# Patient Record
Sex: FEMALE | Race: WHITE | Employment: STUDENT | URBAN - METROPOLITAN AREA
[De-identification: names, ages, dates, MRNs, and addresses within clinical notes are randomized per-mention and may not be internally consistent; named-entity substitution may affect disease eponyms.]

---

## 2019-02-07 ENCOUNTER — HOSPITAL ENCOUNTER (OUTPATIENT)
Dept: GENERAL RADIOLOGY | Age: 19
Discharge: HOME | End: 2019-02-07
Payer: COMMERCIAL

## 2019-02-07 DIAGNOSIS — M47.817 SPONDYLOSIS OF LUMBOSACRAL REGION: ICD-10-CM

## 2019-02-07 PROCEDURE — 72110 X-RAY EXAM L-2 SPINE 4/>VWS: CPT

## 2020-01-12 ENCOUNTER — HOSPITAL ENCOUNTER (INPATIENT)
Age: 20
LOS: 4 days | Discharge: HOME | DRG: 137 | End: 2020-01-16
Attending: FAMILY MEDICINE | Admitting: FAMILY MEDICINE
Payer: COMMERCIAL

## 2020-01-12 DIAGNOSIS — J01.00 ACUTE ABSCESS OF MAXILLARY SINUS: Primary | ICD-10-CM

## 2020-01-12 PROBLEM — J32.0 MAXILLARY SINUSITIS: Status: ACTIVE | Noted: 2020-01-12

## 2020-01-12 PROCEDURE — 0W930ZZ DRAINAGE OF ORAL CAVITY AND THROAT, OPEN APPROACH: ICD-10-PCS | Performed by: PHYSICIAN ASSISTANT

## 2020-01-12 PROCEDURE — 74011250637 HC RX REV CODE- 250/637: Performed by: FAMILY MEDICINE

## 2020-01-12 PROCEDURE — 77030020263 HC SOL INJ SOD CL0.9% LFCR 1000ML

## 2020-01-12 PROCEDURE — 65270000029 HC RM PRIVATE

## 2020-01-12 RX ORDER — SODIUM CHLORIDE 9 MG/ML
1000 INJECTION, SOLUTION INTRAVENOUS CONTINUOUS
Status: DISPENSED | OUTPATIENT
Start: 2020-01-13 | End: 2020-01-13

## 2020-01-12 RX ORDER — SULFAMETHOXAZOLE AND TRIMETHOPRIM 800; 160 MG/1; MG/1
1 TABLET ORAL EVERY 12 HOURS
COMMUNITY
Start: 2020-01-11 | End: 2020-01-16

## 2020-01-12 RX ORDER — OXYCODONE AND ACETAMINOPHEN 10; 325 MG/1; MG/1
1 TABLET ORAL
Status: DISCONTINUED | OUTPATIENT
Start: 2020-01-12 | End: 2020-01-12

## 2020-01-12 RX ORDER — ESCITALOPRAM OXALATE 5 MG/1
10 TABLET ORAL DAILY
COMMUNITY

## 2020-01-12 RX ORDER — LEVONORGESTREL AND ETHINYL ESTRADIOL 0.1-0.02MG
1 KIT ORAL DAILY
COMMUNITY

## 2020-01-12 RX ORDER — ACETAMINOPHEN 325 MG/1
650 TABLET ORAL
Status: DISCONTINUED | OUTPATIENT
Start: 2020-01-12 | End: 2020-01-16 | Stop reason: HOSPADM

## 2020-01-12 RX ORDER — ESCITALOPRAM OXALATE 10 MG/1
5 TABLET ORAL DAILY
Status: DISCONTINUED | OUTPATIENT
Start: 2020-01-13 | End: 2020-01-16 | Stop reason: HOSPADM

## 2020-01-12 RX ORDER — ALPRAZOLAM 0.5 MG/1
0.25 TABLET ORAL
Status: DISCONTINUED | OUTPATIENT
Start: 2020-01-12 | End: 2020-01-16 | Stop reason: HOSPADM

## 2020-01-12 RX ORDER — LIDOCAINE HYDROCHLORIDE 10 MG/ML
10 INJECTION INFILTRATION; PERINEURAL ONCE
Status: DISPENSED | OUTPATIENT
Start: 2020-01-13 | End: 2020-01-13

## 2020-01-12 RX ORDER — ONDANSETRON 2 MG/ML
4 INJECTION INTRAMUSCULAR; INTRAVENOUS
Status: DISCONTINUED | OUTPATIENT
Start: 2020-01-12 | End: 2020-01-16 | Stop reason: HOSPADM

## 2020-01-12 RX ORDER — MORPHINE SULFATE 10 MG/ML
5 INJECTION, SOLUTION INTRAMUSCULAR; INTRAVENOUS
Status: DISCONTINUED | OUTPATIENT
Start: 2020-01-12 | End: 2020-01-12

## 2020-01-12 RX ORDER — NALOXONE HYDROCHLORIDE 0.4 MG/ML
0.4 INJECTION, SOLUTION INTRAMUSCULAR; INTRAVENOUS; SUBCUTANEOUS AS NEEDED
Status: DISCONTINUED | OUTPATIENT
Start: 2020-01-12 | End: 2020-01-16 | Stop reason: HOSPADM

## 2020-01-12 RX ORDER — IBUPROFEN 200 MG
600 TABLET ORAL
COMMUNITY

## 2020-01-12 RX ORDER — ALPRAZOLAM 0.25 MG/1
0.5 TABLET ORAL
COMMUNITY

## 2020-01-12 RX ORDER — LEVONORGESTREL AND ETHINYL ESTRADIOL 0.1-0.02MG
1 KIT ORAL DAILY
Status: DISCONTINUED | OUTPATIENT
Start: 2020-01-13 | End: 2020-01-16 | Stop reason: HOSPADM

## 2020-01-12 RX ORDER — PSEUDOEPHEDRINE HCL 30 MG
60 TABLET ORAL
COMMUNITY
End: 2020-01-16

## 2020-01-12 RX ORDER — OXYMETAZOLINE HCL 0.05 %
2 SPRAY, NON-AEROSOL (ML) NASAL 2 TIMES DAILY
Status: DISCONTINUED | OUTPATIENT
Start: 2020-01-13 | End: 2020-01-16 | Stop reason: HOSPADM

## 2020-01-12 RX ORDER — ALPRAZOLAM 0.5 MG/1
0.25 TABLET ORAL
Status: DISCONTINUED | OUTPATIENT
Start: 2020-01-12 | End: 2020-01-12

## 2020-01-12 RX ORDER — IBUPROFEN 800 MG/1
800 TABLET ORAL
Status: DISCONTINUED | OUTPATIENT
Start: 2020-01-12 | End: 2020-01-16 | Stop reason: HOSPADM

## 2020-01-12 RX ORDER — HYDROMORPHONE HYDROCHLORIDE 1 MG/ML
0.5 INJECTION, SOLUTION INTRAMUSCULAR; INTRAVENOUS; SUBCUTANEOUS
Status: DISCONTINUED | OUTPATIENT
Start: 2020-01-12 | End: 2020-01-14

## 2020-01-12 RX ORDER — BISACODYL 5 MG
5 TABLET, DELAYED RELEASE (ENTERIC COATED) ORAL DAILY PRN
Status: DISCONTINUED | OUTPATIENT
Start: 2020-01-12 | End: 2020-01-16 | Stop reason: HOSPADM

## 2020-01-12 RX ORDER — HYDROCODONE BITARTRATE AND ACETAMINOPHEN 7.5; 325 MG/1; MG/1
1 TABLET ORAL
Status: DISCONTINUED | OUTPATIENT
Start: 2020-01-12 | End: 2020-01-14

## 2020-01-12 RX ORDER — SODIUM CHLORIDE 0.9 % (FLUSH) 0.9 %
5-40 SYRINGE (ML) INJECTION EVERY 8 HOURS
Status: DISCONTINUED | OUTPATIENT
Start: 2020-01-13 | End: 2020-01-16 | Stop reason: HOSPADM

## 2020-01-12 RX ORDER — DIPHENHYDRAMINE HCL 25 MG
25 CAPSULE ORAL
Status: DISCONTINUED | OUTPATIENT
Start: 2020-01-12 | End: 2020-01-16 | Stop reason: HOSPADM

## 2020-01-12 RX ORDER — HYDROCODONE BITARTRATE AND ACETAMINOPHEN 5; 325 MG/1; MG/1
1 TABLET ORAL
COMMUNITY
End: 2020-01-16

## 2020-01-12 RX ORDER — LORAZEPAM 1 MG/1
1 TABLET ORAL
Status: CANCELLED | OUTPATIENT
Start: 2020-01-12

## 2020-01-12 RX ORDER — OXYMETAZOLINE HCL 0.05 %
2 SPRAY, NON-AEROSOL (ML) NASAL
COMMUNITY

## 2020-01-12 RX ORDER — CLINDAMYCIN PHOSPHATE 600 MG/50ML
600 INJECTION INTRAVENOUS EVERY 6 HOURS
Status: DISCONTINUED | OUTPATIENT
Start: 2020-01-13 | End: 2020-01-16

## 2020-01-12 RX ORDER — SODIUM CHLORIDE 0.9 % (FLUSH) 0.9 %
5-40 SYRINGE (ML) INJECTION AS NEEDED
Status: DISCONTINUED | OUTPATIENT
Start: 2020-01-12 | End: 2020-01-16 | Stop reason: HOSPADM

## 2020-01-12 RX ADMIN — Medication 10 ML: at 23:50

## 2020-01-12 RX ADMIN — ALPRAZOLAM 0.25 MG: 0.5 TABLET ORAL at 23:56

## 2020-01-12 RX ADMIN — HYDROCODONE BITARTRATE AND ACETAMINOPHEN 1 TABLET: 7.5; 325 TABLET ORAL at 23:56

## 2020-01-13 ENCOUNTER — ANESTHESIA EVENT (OUTPATIENT)
Dept: SURGERY | Age: 20
DRG: 137 | End: 2020-01-13
Payer: COMMERCIAL

## 2020-01-13 ENCOUNTER — ANESTHESIA (OUTPATIENT)
Dept: SURGERY | Age: 20
DRG: 137 | End: 2020-01-13
Payer: COMMERCIAL

## 2020-01-13 PROBLEM — J01.00 ACUTE ABSCESS OF MAXILLARY SINUS: Status: ACTIVE | Noted: 2020-01-13

## 2020-01-13 LAB
ALBUMIN SERPL-MCNC: 3.6 G/DL (ref 3.5–5)
ALBUMIN/GLOB SERPL: 0.8 {RATIO} (ref 1.2–3.5)
ALP SERPL-CCNC: 72 U/L (ref 50–136)
ALT SERPL-CCNC: 58 U/L (ref 12–65)
ANION GAP SERPL CALC-SCNC: 6 MMOL/L (ref 7–16)
AST SERPL-CCNC: 30 U/L (ref 15–37)
BASOPHILS # BLD: 0 K/UL (ref 0–0.2)
BASOPHILS # BLD: 0 K/UL (ref 0–0.2)
BASOPHILS NFR BLD: 0 % (ref 0–2)
BASOPHILS NFR BLD: 0 % (ref 0–2)
BILIRUB SERPL-MCNC: 0.2 MG/DL (ref 0.2–1.1)
BUN SERPL-MCNC: 6 MG/DL (ref 6–23)
CALCIUM SERPL-MCNC: 9.1 MG/DL (ref 8.3–10.4)
CHLORIDE SERPL-SCNC: 104 MMOL/L (ref 98–107)
CO2 SERPL-SCNC: 28 MMOL/L (ref 21–32)
CREAT SERPL-MCNC: 1.02 MG/DL (ref 0.6–1)
DIFFERENTIAL METHOD BLD: NORMAL
DIFFERENTIAL METHOD BLD: NORMAL
EOSINOPHIL # BLD: 0.1 K/UL (ref 0–0.8)
EOSINOPHIL # BLD: 0.1 K/UL (ref 0–0.8)
EOSINOPHIL NFR BLD: 1 % (ref 0.5–7.8)
EOSINOPHIL NFR BLD: 1 % (ref 0.5–7.8)
ERYTHROCYTE [DISTWIDTH] IN BLOOD BY AUTOMATED COUNT: 12.4 % (ref 11.9–14.6)
ERYTHROCYTE [DISTWIDTH] IN BLOOD BY AUTOMATED COUNT: 12.4 % (ref 11.9–14.6)
GLOBULIN SER CALC-MCNC: 4.4 G/DL (ref 2.3–3.5)
GLUCOSE SERPL-MCNC: 102 MG/DL (ref 65–100)
HCG UR QL: NEGATIVE
HCT VFR BLD AUTO: 38.3 % (ref 35.8–46.3)
HCT VFR BLD AUTO: 43.2 % (ref 35.8–46.3)
HGB BLD-MCNC: 12.4 G/DL (ref 11.7–15.4)
HGB BLD-MCNC: 14 G/DL (ref 11.7–15.4)
IMM GRANULOCYTES # BLD AUTO: 0 K/UL (ref 0–0.5)
IMM GRANULOCYTES # BLD AUTO: 0.1 K/UL (ref 0–0.5)
IMM GRANULOCYTES NFR BLD AUTO: 0 % (ref 0–5)
IMM GRANULOCYTES NFR BLD AUTO: 1 % (ref 0–5)
LACTATE SERPL-SCNC: 0.6 MMOL/L (ref 0.4–2)
LYMPHOCYTES # BLD: 1.9 K/UL (ref 0.5–4.6)
LYMPHOCYTES # BLD: 2.4 K/UL (ref 0.5–4.6)
LYMPHOCYTES NFR BLD: 28 % (ref 13–44)
LYMPHOCYTES NFR BLD: 31 % (ref 13–44)
MCH RBC QN AUTO: 30.2 PG (ref 26.1–32.9)
MCH RBC QN AUTO: 30.4 PG (ref 26.1–32.9)
MCHC RBC AUTO-ENTMCNC: 32.4 G/DL (ref 31.4–35)
MCHC RBC AUTO-ENTMCNC: 32.4 G/DL (ref 31.4–35)
MCV RBC AUTO: 93.3 FL (ref 79.6–97.8)
MCV RBC AUTO: 93.9 FL (ref 79.6–97.8)
MONOCYTES # BLD: 0.6 K/UL (ref 0.1–1.3)
MONOCYTES # BLD: 0.8 K/UL (ref 0.1–1.3)
MONOCYTES NFR BLD: 10 % (ref 4–12)
MONOCYTES NFR BLD: 9 % (ref 4–12)
NEUTS SEG # BLD: 3.6 K/UL (ref 1.7–8.2)
NEUTS SEG # BLD: 5.4 K/UL (ref 1.7–8.2)
NEUTS SEG NFR BLD: 58 % (ref 43–78)
NEUTS SEG NFR BLD: 62 % (ref 43–78)
NRBC # BLD: 0 K/UL (ref 0–0.2)
NRBC # BLD: 0 K/UL (ref 0–0.2)
PLATELET # BLD AUTO: 189 K/UL (ref 150–450)
PLATELET # BLD AUTO: 203 K/UL (ref 150–450)
PMV BLD AUTO: 10.1 FL (ref 9.4–12.3)
PMV BLD AUTO: 10.3 FL (ref 9.4–12.3)
POTASSIUM SERPL-SCNC: 4 MMOL/L (ref 3.5–5.1)
PROCALCITONIN SERPL-MCNC: <0.05 NG/ML
PROT SERPL-MCNC: 8 G/DL (ref 6.3–8.2)
RBC # BLD AUTO: 4.08 M/UL (ref 4.05–5.2)
RBC # BLD AUTO: 4.63 M/UL (ref 4.05–5.2)
SODIUM SERPL-SCNC: 138 MMOL/L (ref 136–145)
WBC # BLD AUTO: 6.2 K/UL (ref 4.3–11.1)
WBC # BLD AUTO: 8.8 K/UL (ref 4.3–11.1)

## 2020-01-13 PROCEDURE — 77030026438 HC STYL ET INTUB CARD -A: Performed by: ANESTHESIOLOGY

## 2020-01-13 PROCEDURE — 74011000250 HC RX REV CODE- 250: Performed by: DENTIST

## 2020-01-13 PROCEDURE — 87102 FUNGUS ISOLATION CULTURE: CPT

## 2020-01-13 PROCEDURE — 77030002996 HC SUT SLK J&J -A: Performed by: DENTIST

## 2020-01-13 PROCEDURE — 77030021678 HC GLIDESCP STAT DISP VERT -B: Performed by: ANESTHESIOLOGY

## 2020-01-13 PROCEDURE — 84145 PROCALCITONIN (PCT): CPT

## 2020-01-13 PROCEDURE — 77030020255 HC SOL INJ LR 1000ML BG

## 2020-01-13 PROCEDURE — 36415 COLL VENOUS BLD VENIPUNCTURE: CPT

## 2020-01-13 PROCEDURE — 87076 CULTURE ANAEROBE IDENT EACH: CPT

## 2020-01-13 PROCEDURE — 77030020263 HC SOL INJ SOD CL0.9% LFCR 1000ML

## 2020-01-13 PROCEDURE — 80053 COMPREHEN METABOLIC PANEL: CPT

## 2020-01-13 PROCEDURE — 77030037088 HC TUBE ENDOTRACH ORAL NSL COVD-A: Performed by: ANESTHESIOLOGY

## 2020-01-13 PROCEDURE — 81025 URINE PREGNANCY TEST: CPT

## 2020-01-13 PROCEDURE — 74011250636 HC RX REV CODE- 250/636: Performed by: NURSE ANESTHETIST, CERTIFIED REGISTERED

## 2020-01-13 PROCEDURE — 74011250636 HC RX REV CODE- 250/636: Performed by: ANESTHESIOLOGY

## 2020-01-13 PROCEDURE — 74011000250 HC RX REV CODE- 250: Performed by: NURSE ANESTHETIST, CERTIFIED REGISTERED

## 2020-01-13 PROCEDURE — 87153 DNA/RNA SEQUENCING: CPT

## 2020-01-13 PROCEDURE — 77030002888 HC SUT CHRMC J&J -A: Performed by: DENTIST

## 2020-01-13 PROCEDURE — 87205 SMEAR GRAM STAIN: CPT

## 2020-01-13 PROCEDURE — 65270000029 HC RM PRIVATE

## 2020-01-13 PROCEDURE — 74011000258 HC RX REV CODE- 258: Performed by: FAMILY MEDICINE

## 2020-01-13 PROCEDURE — 76060000034 HC ANESTHESIA 1.5 TO 2 HR: Performed by: DENTIST

## 2020-01-13 PROCEDURE — 76210000000 HC OR PH I REC 2 TO 2.5 HR: Performed by: DENTIST

## 2020-01-13 PROCEDURE — 74011250636 HC RX REV CODE- 250/636: Performed by: FAMILY MEDICINE

## 2020-01-13 PROCEDURE — 83605 ASSAY OF LACTIC ACID: CPT

## 2020-01-13 PROCEDURE — 77030040503 HC DRN WND PENRS MDII -A: Performed by: DENTIST

## 2020-01-13 PROCEDURE — 87040 BLOOD CULTURE FOR BACTERIA: CPT

## 2020-01-13 PROCEDURE — 87075 CULTR BACTERIA EXCEPT BLOOD: CPT

## 2020-01-13 PROCEDURE — 76010000153 HC OR TIME 1.5 TO 2 HR: Performed by: DENTIST

## 2020-01-13 PROCEDURE — 85025 COMPLETE CBC W/AUTO DIFF WBC: CPT

## 2020-01-13 PROCEDURE — 74011000250 HC RX REV CODE- 250: Performed by: ANESTHESIOLOGY

## 2020-01-13 PROCEDURE — 74011250637 HC RX REV CODE- 250/637: Performed by: FAMILY MEDICINE

## 2020-01-13 RX ORDER — HYDROMORPHONE HYDROCHLORIDE 2 MG/ML
0.5 INJECTION, SOLUTION INTRAMUSCULAR; INTRAVENOUS; SUBCUTANEOUS
Status: DISCONTINUED | OUTPATIENT
Start: 2020-01-13 | End: 2020-01-13 | Stop reason: HOSPADM

## 2020-01-13 RX ORDER — LIDOCAINE HYDROCHLORIDE 10 MG/ML
0.1 INJECTION INFILTRATION; PERINEURAL AS NEEDED
Status: DISCONTINUED | OUTPATIENT
Start: 2020-01-13 | End: 2020-01-13 | Stop reason: HOSPADM

## 2020-01-13 RX ORDER — MIDAZOLAM HYDROCHLORIDE 1 MG/ML
2 INJECTION, SOLUTION INTRAMUSCULAR; INTRAVENOUS ONCE
Status: DISCONTINUED | OUTPATIENT
Start: 2020-01-13 | End: 2020-01-13 | Stop reason: ALTCHOICE

## 2020-01-13 RX ORDER — LORAZEPAM 2 MG/ML
1 INJECTION INTRAMUSCULAR ONCE
Status: DISCONTINUED | OUTPATIENT
Start: 2020-01-13 | End: 2020-01-13 | Stop reason: ALTCHOICE

## 2020-01-13 RX ORDER — KETOROLAC TROMETHAMINE 30 MG/ML
30 INJECTION, SOLUTION INTRAMUSCULAR; INTRAVENOUS
Status: DISCONTINUED | OUTPATIENT
Start: 2020-01-13 | End: 2020-01-13 | Stop reason: ALTCHOICE

## 2020-01-13 RX ORDER — BUPIVACAINE HYDROCHLORIDE AND EPINEPHRINE 5; 5 MG/ML; UG/ML
INJECTION, SOLUTION EPIDURAL; INTRACAUDAL; PERINEURAL AS NEEDED
Status: DISCONTINUED | OUTPATIENT
Start: 2020-01-13 | End: 2020-01-13 | Stop reason: HOSPADM

## 2020-01-13 RX ORDER — NALOXONE HYDROCHLORIDE 0.4 MG/ML
0.2 INJECTION, SOLUTION INTRAMUSCULAR; INTRAVENOUS; SUBCUTANEOUS AS NEEDED
Status: DISCONTINUED | OUTPATIENT
Start: 2020-01-13 | End: 2020-01-13 | Stop reason: HOSPADM

## 2020-01-13 RX ORDER — SODIUM CHLORIDE, SODIUM LACTATE, POTASSIUM CHLORIDE, CALCIUM CHLORIDE 600; 310; 30; 20 MG/100ML; MG/100ML; MG/100ML; MG/100ML
75 INJECTION, SOLUTION INTRAVENOUS CONTINUOUS
Status: DISCONTINUED | OUTPATIENT
Start: 2020-01-13 | End: 2020-01-13 | Stop reason: HOSPADM

## 2020-01-13 RX ORDER — KETAMINE HYDROCHLORIDE 50 MG/ML
10 INJECTION, SOLUTION INTRAMUSCULAR; INTRAVENOUS ONCE
Status: COMPLETED | OUTPATIENT
Start: 2020-01-13 | End: 2020-01-13

## 2020-01-13 RX ORDER — CHLORHEXIDINE GLUCONATE 1.2 MG/ML
RINSE ORAL AS NEEDED
Status: DISCONTINUED | OUTPATIENT
Start: 2020-01-13 | End: 2020-01-13 | Stop reason: HOSPADM

## 2020-01-13 RX ORDER — ACETAMINOPHEN 10 MG/ML
1000 INJECTION, SOLUTION INTRAVENOUS
Status: COMPLETED | OUTPATIENT
Start: 2020-01-13 | End: 2020-01-13

## 2020-01-13 RX ORDER — DEXAMETHASONE SODIUM PHOSPHATE 4 MG/ML
INJECTION, SOLUTION INTRA-ARTICULAR; INTRALESIONAL; INTRAMUSCULAR; INTRAVENOUS; SOFT TISSUE AS NEEDED
Status: DISCONTINUED | OUTPATIENT
Start: 2020-01-13 | End: 2020-01-13 | Stop reason: HOSPADM

## 2020-01-13 RX ORDER — LIDOCAINE HYDROCHLORIDE 20 MG/ML
INJECTION, SOLUTION EPIDURAL; INFILTRATION; INTRACAUDAL; PERINEURAL AS NEEDED
Status: DISCONTINUED | OUTPATIENT
Start: 2020-01-13 | End: 2020-01-13 | Stop reason: HOSPADM

## 2020-01-13 RX ORDER — OXYCODONE AND ACETAMINOPHEN 5; 325 MG/1; MG/1
1 TABLET ORAL AS NEEDED
Status: DISCONTINUED | OUTPATIENT
Start: 2020-01-13 | End: 2020-01-13 | Stop reason: HOSPADM

## 2020-01-13 RX ORDER — PROPOFOL 10 MG/ML
INJECTION, EMULSION INTRAVENOUS AS NEEDED
Status: DISCONTINUED | OUTPATIENT
Start: 2020-01-13 | End: 2020-01-13 | Stop reason: HOSPADM

## 2020-01-13 RX ORDER — MIDAZOLAM HYDROCHLORIDE 1 MG/ML
2 INJECTION, SOLUTION INTRAMUSCULAR; INTRAVENOUS
Status: COMPLETED | OUTPATIENT
Start: 2020-01-13 | End: 2020-01-13

## 2020-01-13 RX ORDER — ONDANSETRON 2 MG/ML
INJECTION INTRAMUSCULAR; INTRAVENOUS AS NEEDED
Status: DISCONTINUED | OUTPATIENT
Start: 2020-01-13 | End: 2020-01-13 | Stop reason: HOSPADM

## 2020-01-13 RX ORDER — FENTANYL CITRATE 50 UG/ML
INJECTION, SOLUTION INTRAMUSCULAR; INTRAVENOUS AS NEEDED
Status: DISCONTINUED | OUTPATIENT
Start: 2020-01-13 | End: 2020-01-13 | Stop reason: HOSPADM

## 2020-01-13 RX ORDER — DIPHENHYDRAMINE HYDROCHLORIDE 50 MG/ML
12.5 INJECTION, SOLUTION INTRAMUSCULAR; INTRAVENOUS
Status: COMPLETED | OUTPATIENT
Start: 2020-01-13 | End: 2020-01-13

## 2020-01-13 RX ORDER — KETAMINE HYDROCHLORIDE 50 MG/ML
20 INJECTION, SOLUTION INTRAMUSCULAR; INTRAVENOUS ONCE
Status: COMPLETED | OUTPATIENT
Start: 2020-01-13 | End: 2020-01-13

## 2020-01-13 RX ORDER — ROCURONIUM BROMIDE 10 MG/ML
INJECTION, SOLUTION INTRAVENOUS AS NEEDED
Status: DISCONTINUED | OUTPATIENT
Start: 2020-01-13 | End: 2020-01-13 | Stop reason: HOSPADM

## 2020-01-13 RX ORDER — SODIUM CHLORIDE 0.9 % (FLUSH) 0.9 %
5-40 SYRINGE (ML) INJECTION EVERY 8 HOURS
Status: DISCONTINUED | OUTPATIENT
Start: 2020-01-13 | End: 2020-01-13 | Stop reason: HOSPADM

## 2020-01-13 RX ORDER — SODIUM CHLORIDE 0.9 % (FLUSH) 0.9 %
5-40 SYRINGE (ML) INJECTION AS NEEDED
Status: DISCONTINUED | OUTPATIENT
Start: 2020-01-13 | End: 2020-01-13 | Stop reason: HOSPADM

## 2020-01-13 RX ADMIN — DIPHENHYDRAMINE HYDROCHLORIDE 12.5 MG: 50 INJECTION, SOLUTION INTRAMUSCULAR; INTRAVENOUS at 21:54

## 2020-01-13 RX ADMIN — DIPHENHYDRAMINE HYDROCHLORIDE 25 MG: 25 CAPSULE ORAL at 11:30

## 2020-01-13 RX ADMIN — HYDROMORPHONE HYDROCHLORIDE 0.5 MG: 2 INJECTION INTRAMUSCULAR; INTRAVENOUS; SUBCUTANEOUS at 20:37

## 2020-01-13 RX ADMIN — ONDANSETRON 4 MG: 2 INJECTION INTRAMUSCULAR; INTRAVENOUS at 18:50

## 2020-01-13 RX ADMIN — HYDROCODONE BITARTRATE AND ACETAMINOPHEN 1 TABLET: 7.5; 325 TABLET ORAL at 05:57

## 2020-01-13 RX ADMIN — SODIUM CHLORIDE, SODIUM LACTATE, POTASSIUM CHLORIDE, AND CALCIUM CHLORIDE: 600; 310; 30; 20 INJECTION, SOLUTION INTRAVENOUS at 16:30

## 2020-01-13 RX ADMIN — HYDROMORPHONE HYDROCHLORIDE 0.5 MG: 1 INJECTION, SOLUTION INTRAMUSCULAR; INTRAVENOUS; SUBCUTANEOUS at 01:16

## 2020-01-13 RX ADMIN — KETAMINE HYDROCHLORIDE 10 MG: 50 INJECTION INTRAMUSCULAR; INTRAVENOUS at 21:25

## 2020-01-13 RX ADMIN — SODIUM CHLORIDE, SODIUM LACTATE, POTASSIUM CHLORIDE, AND CALCIUM CHLORIDE: 600; 310; 30; 20 INJECTION, SOLUTION INTRAVENOUS at 19:31

## 2020-01-13 RX ADMIN — PROPOFOL 200 MG: 10 INJECTION, EMULSION INTRAVENOUS at 18:32

## 2020-01-13 RX ADMIN — FENTANYL CITRATE 50 MCG: 50 INJECTION INTRAMUSCULAR; INTRAVENOUS at 18:32

## 2020-01-13 RX ADMIN — ESCITALOPRAM OXALATE 5 MG: 10 TABLET ORAL at 08:27

## 2020-01-13 RX ADMIN — PROPOFOL 50 MG: 10 INJECTION, EMULSION INTRAVENOUS at 19:15

## 2020-01-13 RX ADMIN — HYDROMORPHONE HYDROCHLORIDE 0.5 MG: 2 INJECTION INTRAMUSCULAR; INTRAVENOUS; SUBCUTANEOUS at 20:18

## 2020-01-13 RX ADMIN — ALPRAZOLAM 0.25 MG: 0.5 TABLET ORAL at 23:01

## 2020-01-13 RX ADMIN — PROPOFOL 50 MG: 10 INJECTION, EMULSION INTRAVENOUS at 19:16

## 2020-01-13 RX ADMIN — CLINDAMYCIN PHOSPHATE 600 MG: 600 INJECTION, SOLUTION INTRAVENOUS at 11:31

## 2020-01-13 RX ADMIN — CEFTRIAXONE 1 G: 1 INJECTION, POWDER, FOR SOLUTION INTRAMUSCULAR; INTRAVENOUS at 00:25

## 2020-01-13 RX ADMIN — FENTANYL CITRATE 25 MCG: 50 INJECTION INTRAMUSCULAR; INTRAVENOUS at 18:39

## 2020-01-13 RX ADMIN — CLINDAMYCIN PHOSPHATE 600 MG: 600 INJECTION, SOLUTION INTRAVENOUS at 17:33

## 2020-01-13 RX ADMIN — MIDAZOLAM 2 MG: 1 INJECTION INTRAMUSCULAR; INTRAVENOUS at 17:09

## 2020-01-13 RX ADMIN — HYDROMORPHONE HYDROCHLORIDE 0.5 MG: 1 INJECTION, SOLUTION INTRAMUSCULAR; INTRAVENOUS; SUBCUTANEOUS at 10:52

## 2020-01-13 RX ADMIN — SODIUM CHLORIDE 1000 ML: 900 INJECTION, SOLUTION INTRAVENOUS at 00:32

## 2020-01-13 RX ADMIN — HYDROCODONE BITARTRATE AND ACETAMINOPHEN 1 TABLET: 7.5; 325 TABLET ORAL at 23:01

## 2020-01-13 RX ADMIN — ACETAMINOPHEN 1000 MG: 10 INJECTION, SOLUTION INTRAVENOUS at 20:35

## 2020-01-13 RX ADMIN — Medication 10 ML: at 13:52

## 2020-01-13 RX ADMIN — CLINDAMYCIN PHOSPHATE 600 MG: 600 INJECTION, SOLUTION INTRAVENOUS at 05:24

## 2020-01-13 RX ADMIN — LIDOCAINE HYDROCHLORIDE 80 MG: 20 INJECTION, SOLUTION EPIDURAL; INFILTRATION; INTRACAUDAL; PERINEURAL at 18:32

## 2020-01-13 RX ADMIN — HYDROMORPHONE HYDROCHLORIDE 0.5 MG: 2 INJECTION INTRAMUSCULAR; INTRAVENOUS; SUBCUTANEOUS at 20:42

## 2020-01-13 RX ADMIN — HYDROCODONE BITARTRATE AND ACETAMINOPHEN 1 TABLET: 7.5; 325 TABLET ORAL at 14:48

## 2020-01-13 RX ADMIN — HYDROMORPHONE HYDROCHLORIDE 0.5 MG: 2 INJECTION INTRAMUSCULAR; INTRAVENOUS; SUBCUTANEOUS at 20:13

## 2020-01-13 RX ADMIN — DEXAMETHASONE SODIUM PHOSPHATE 5 MG: 4 INJECTION, SOLUTION INTRAMUSCULAR; INTRAVENOUS at 19:40

## 2020-01-13 RX ADMIN — OXYMETAZOLINE HCL 2 SPRAY: 0.05 SPRAY NASAL at 08:25

## 2020-01-13 RX ADMIN — KETAMINE HYDROCHLORIDE 20 MG: 50 INJECTION, SOLUTION INTRAMUSCULAR; INTRAVENOUS at 20:49

## 2020-01-13 RX ADMIN — ROCURONIUM BROMIDE 5 MG: 10 INJECTION, SOLUTION INTRAVENOUS at 18:32

## 2020-01-13 RX ADMIN — HYDROMORPHONE HYDROCHLORIDE 0.5 MG: 2 INJECTION INTRAMUSCULAR; INTRAVENOUS; SUBCUTANEOUS at 20:23

## 2020-01-13 RX ADMIN — FENTANYL CITRATE 25 MCG: 50 INJECTION INTRAMUSCULAR; INTRAVENOUS at 18:48

## 2020-01-13 RX ADMIN — ONDANSETRON 4 MG: 2 INJECTION INTRAMUSCULAR; INTRAVENOUS at 11:37

## 2020-01-13 RX ADMIN — DEXAMETHASONE SODIUM PHOSPHATE 5 MG: 4 INJECTION, SOLUTION INTRAMUSCULAR; INTRAVENOUS at 18:42

## 2020-01-13 RX ADMIN — CLINDAMYCIN PHOSPHATE 600 MG: 600 INJECTION, SOLUTION INTRAVENOUS at 23:55

## 2020-01-13 RX ADMIN — HYDROMORPHONE HYDROCHLORIDE 0.5 MG: 2 INJECTION INTRAMUSCULAR; INTRAVENOUS; SUBCUTANEOUS at 20:08

## 2020-01-13 RX ADMIN — HYDROMORPHONE HYDROCHLORIDE 0.5 MG: 2 INJECTION INTRAMUSCULAR; INTRAVENOUS; SUBCUTANEOUS at 20:28

## 2020-01-13 RX ADMIN — CLINDAMYCIN PHOSPHATE 600 MG: 600 INJECTION, SOLUTION INTRAVENOUS at 01:15

## 2020-01-13 RX ADMIN — HYDROMORPHONE HYDROCHLORIDE 0.5 MG: 2 INJECTION INTRAMUSCULAR; INTRAVENOUS; SUBCUTANEOUS at 20:32

## 2020-01-13 RX ADMIN — IBUPROFEN 800 MG: 800 TABLET, FILM COATED ORAL at 08:26

## 2020-01-13 NOTE — PROGRESS NOTES
Pt complains of right jaw pain 5/10. PRN Motrin 800 mg given PO per MD order. Safety measures in place, call light within reach, family at bedside. Will continue to monitor.

## 2020-01-13 NOTE — PROGRESS NOTES
01/12/20 2130   Dual Skin Pressure Injury Assessment   Dual Skin Pressure Injury Assessment WDL   Second Care Provider (Based on Facility Policy) Yazmin Rowe. RN   Skin Integumentary   Skin Integumentary (WDL) WDL   Skin Color Appropriate for ethnicity   Skin Condition/Temp Dry; Warm   Skin Integrity Intact   Turgor Non-tenting

## 2020-01-13 NOTE — PROGRESS NOTES
Care Management Interventions  PCP Verified by CM: Yes  Palliative Care Criteria Met (RRAT>21 & CHF Dx)?: No(RRAT 0 Dx Maxillary Sinusitis)  Transition of Care Consult (CM Consult): Discharge Planning  Discharge Durable Medical Equipment: No(None)  Physical Therapy Consult: No  Occupational Therapy Consult: No  Speech Therapy Consult: No  Current Support Network: Other(during school lives with roommates in apartment and other time lives at parents)  Confirm Follow Up Transport: Self  Discharge Location  Discharge Placement: Home  Met with patient for d/c planning. Patient alert and oriented x 3, independent of ADL's and lives with a roommate in an apartment. She is a student at [employer]. She requires no DME and has transportation. She has [employer] and able to obtain medications. She voices no concerns or needs. Noted IV antibiotics, cultures pending and ID consult. CM following for d/c plans. Current d/c plan is home pending clinical progress.

## 2020-01-13 NOTE — PROGRESS NOTES
Pt complains of right jaw pain 7/10. PRN Norco 7.5-325 mg one tab given PO per MD order. Safety measures in place, call light within reach. Will continue to monitor.

## 2020-01-13 NOTE — PROGRESS NOTES
TRANSFER - OUT REPORT:    Verbal report given to Yessenia Harkins RN (name) on [facility]  being transferred to [facility]. (unit) for ordered procedure       Report consisted of patients Situation, Background, Assessment and   Recommendations(SBAR). Information from the following report(s) SBAR, Kardex, Intake/Output, MAR and Recent Results was reviewed with the receiving nurse. Lines:   Peripheral IV 01/12/20 Anterior; Left Forearm (Active)   Site Assessment Clean, dry, & intact 1/13/2020  8:08 AM   Phlebitis Assessment 0 1/13/2020  8:08 AM   Infiltration Assessment 0 1/13/2020  8:08 AM   Dressing Status Clean, dry, & intact 1/13/2020  8:08 AM   Dressing Type Tape;Transparent 1/13/2020  8:08 AM   Hub Color/Line Status Patent; Infusing 1/13/2020  8:08 AM        Opportunity for questions and clarification was provided.       Patient transported with:

## 2020-01-13 NOTE — PROGRESS NOTES
Pt complains of right jaw pain 8/10. PRN Dilaudid 0.5 mg given slow, IVP per MD order. Safety measures in place, call light within reach, family at bedside. Will continue to monitor.

## 2020-01-13 NOTE — PROGRESS NOTES
Pt arrived on floor via wheelchair with mom at side. Pt is ambulatory. Pt is oriented to room, and how to call nurses station. All questions answered at this time. No needs voiced at this time.

## 2020-01-13 NOTE — PROGRESS NOTES
Problem: Falls - Risk of  Goal: *Absence of Falls  Description  Document Redgie Curet Fall Risk and appropriate interventions in the flowsheet.   Outcome: Progressing Towards Goal  Note: Fall Risk Interventions:            Medication Interventions: Teach patient to arise slowly

## 2020-01-13 NOTE — H&P
History and Physical    Patient: Marcy Theodore MRN: [id number]  SSN: [id number]    YOB: 2000  Age: 20 y.o. Sex: female      Subjective:      Marcy Theodore is a 20 y.o. female who presents with worsening facial swelling s/p extraction of 3rd molars. She was seen in [facility]26 [facility], [place], operated by [facility] last Friday for Incision/Debridement of #1 site due to persistent drainage from her right maxillary sinus. Sinus was irrigated and drained of any purulence and wound culture/sensitivity was submitted. Patient has been experiencing swelling of the right cheek that has extended to the neck slightly as well as the right temporal region. She was admitted today for medical and surgical management. She also went to [facility] for evaluation yesterday. CT was taken and copy given to her. Labs were drawn and patient was placed on Bactrim in addition to Clindamycin. She denies dyspnea. Previously difficult to swallow, but she tolerating food/liquids. No past medical history on file. No past surgical history on file. No family history on file. Social History     Tobacco Use    Smoking status: Not on file   Substance Use Topics    Alcohol use: Not on file      Prior to Admission medications    Medication Sig Start Date End Date Taking? Authorizing Provider   HYDROcodone-acetaminophen (NORCO) 5-325 mg per tablet Take 1 Tab by mouth every six (6) hours as needed for Pain. Yes Provider, Historical   trimethoprim-sulfamethoxazole (BACTRIM DS) 160-800 mg per tablet Take 1 Tab by mouth every twelve (12) hours. 1/11/20 1/18/20 Yes Provider, Historical   ALPRAZolam (XANAX) 0.25 mg tablet Take 0.25 mg by mouth as needed for Anxiety. Yes Provider, Historical   levonorgestrel-ethinyl estradiol (AVIANE, ALESSE, LESSINA) 0.1-20 mg-mcg tab Take 1 Tab by mouth daily. Yes Provider, Historical   escitalopram oxalate (LEXAPRO) 5 mg tablet Take 5 mg by mouth daily.  Indications: Anxiousness associated with Depression   Yes Provider, Historical   oxymetazoline (AFRIN) 0.05 % nasal spray 2 Sprays by Both Nostrils route two (2) times a day. Yes Provider, Historical   ibuprofen (MOTRIN) 200 mg tablet Take 600 mg by mouth every eight (8) hours as needed for Pain. Indications: pain   Yes Provider, Historical   pseudoephedrine (SUDAFED) 30 mg tablet Take 60 mg by mouth every four (4) hours as needed for Congestion. Yes Provider, Historical        Allergies   Allergen Reactions    Gluten Other (comments)    Prednisolone Other (comments)     Tachycardia; hives; SOB       Review of Systems:  Trismus  No dyspnea    Objective:     Vitals:    01/12/20 2130 01/12/20 2319   BP: 127/84 117/78   Pulse: 95 81   Resp: 16 17   Temp: 98.3 °F (36.8 °C) 99 °F (37.2 °C)   SpO2: 95% 98%        Physical Exam:  GENERAL: alert, cooperative, no distress, appears stated age  EYE: negative  LYMPHATIC: Cervical, supraclavicular, and axillary nodes normal.   Right facial swelling is firm and warm. Mild edema in the right temporal region as well as the right neck. Inferior border is intact. No fluctance evident in neck or temporal region. No discoloration of the neck/temporal region. JOVAN approximately 15mm. Assessment:     Hospital Problems  Never Reviewed          Codes Class Noted POA    Maxillary sinusitis ICD-10-CM: J32.0  ICD-9-CM: 473.0  1/12/2020 Unknown              Plan:     IV antibiotics: Clindamycin  Plan for Incision/Drainage of right facial swelling as well as right maxillary sinus infection in the OR tomorrow. Waiting on culture results.      Signed By: Navarro Lucas DMD     January 12, 2020

## 2020-01-13 NOTE — PROGRESS NOTES
Pt is resting quietly in bed. Bed is low and locked with call light in reach. Assessment complete and documented in flowsheets. Pt is A&O x4. No acute signs of distress, and no needs voiced at this time.

## 2020-01-13 NOTE — PROGRESS NOTES
Shift assessment complete. Pt pleasant, alert and oriented x4. Respirations even and unlabored. Lung sounds clear on room air. HR regular. Abdomen soft with active bowel sounds heard in all four quadrants. Skin intact. Swelling noted to the right side of the face and jaw. Pt denies difficulty swallowing or breathing. Pt complains of jaw and facial pain, will medicate. IV patent and infusing. Pt made aware of NPO status effective at 0900. Pt and family verbalized understanding. All needs met at this time. Safety measures in place, call light within reach, family at bedside. Will continue to monitor.

## 2020-01-13 NOTE — PERIOP NOTES
TRANSFER - IN REPORT:    Verbal report received from Yasir Pop Dr RN on Caitlin Breaux  being received from 3rd floor for routine progression of care      Report consisted of patients Situation, Background, Assessment and   Recommendations(SBAR). Information from the following report(s) Kardex was reviewed with the receiving nurse. Opportunity for questions and clarification was provided. Assessment completed upon patients arrival to unit and care assumed.

## 2020-01-13 NOTE — ANESTHESIA PREPROCEDURE EVALUATION
Relevant Problems   No relevant active problems       Anesthetic History   No history of anesthetic complications            Review of Systems / Medical History  Patient summary reviewed and pertinent labs reviewed    Pulmonary  Within defined limits        Undiagnosed apnea         Neuro/Psych   Within defined limits           Cardiovascular            Dysrhythmias : SVT      Exercise tolerance: >4 METS     GI/Hepatic/Renal  Within defined limits              Endo/Other  Within defined limits           Other Findings              Physical Exam    Airway  Mallampati: III  TM Distance: < 4 cm  Neck ROM: normal range of motion   Mouth opening: Diminished (comment)     Cardiovascular    Rhythm: regular           Dental  No notable dental hx       Pulmonary  Breath sounds clear to auscultation               Abdominal         Other Findings            Anesthetic Plan    ASA: 2  Anesthesia type: general          Induction: Intravenous  Anesthetic plan and risks discussed with: Patient and Family

## 2020-01-13 NOTE — PROGRESS NOTES
1130: pt complains of itching. PRN Benadryl 25 mg given PO per MD order. 1137: pt with sudden nausea and vomiting. PRN Zofran 4 mg given slow, IVP per MD order. Pt also provided with a cool cloth and peppermint oil for comfort. All needs met at this time. Safety measures in place, call light within reach, family at bedside. Will continue to monitor.

## 2020-01-13 NOTE — CONSULTS
Infectious Disease Consult    Today's Date: 1/13/2020   Admit Date: 1/12/2020    Impression:   · R maxillary sinusitis/abscess following wisdom tooth extraction    Plan:   ·  Await operative cultures, will plan antibiotic coverage for oral daniel  · Continue ceftriaxone and clindamycin at present    Anti-infectives:   · Ceftriaxone  · Clindamycin    Subjective:   Date of Consultation:  January 13, 2020  Referring Physician: Fanny Dexter    Patient is a 20 y.o. female underwent wisdom tooth extraction in December, and has had persistent maxillary sinus drainage since. She has been on several courses of oral antibiotics with persistence of drainage and R facial swelling. She underwent outpatient debridement last week and is now admitted for further I and D and culture. A culture was taken in Oral Surgery office, results not presently available,  and a CT was done at [facility] on 1/11: IMPRESSION:  1.  Odontogenic abscess arising from the right maxillary wisdom tooth extraction site with a narrow sinus tract that extends to the 3.0 x 2.1 cm abscess located in the right mandibular subcutaneous fat just anterior to the right masseter muscle. 2.  Right maxillary sinusitis with direct communication with the right maxillary was injected to the extraction site. 3.  Mild reactive submandibular adenopathy. Patient Active Problem List   Diagnosis Code    Maxillary sinusitis J32.0    Acute abscess of maxillary sinus J01.00     No past medical history on file. No family history on file. Social History     Tobacco Use    Smoking status: Not on file   Substance Use Topics    Alcohol use: Not on file     No past surgical history on file. Prior to Admission medications    Medication Sig Start Date End Date Taking? Authorizing Provider   HYDROcodone-acetaminophen (NORCO) 5-325 mg per tablet Take 1 Tab by mouth every six (6) hours as needed for Pain.    Yes Provider, Historical   trimethoprim-sulfamethoxazole (BACTRIM DS) 160-800 mg per tablet Take 1 Tab by mouth every twelve (12) hours. 20 Yes Provider, Historical   ALPRAZolam (XANAX) 0.25 mg tablet Take 0.25 mg by mouth as needed for Anxiety. Yes Provider, Historical   levonorgestrel-ethinyl estradiol (AVIANE, ALESSE, LESSINA) 0.1-20 mg-mcg tab Take 1 Tab by mouth daily. Yes Provider, Historical   escitalopram oxalate (LEXAPRO) 5 mg tablet Take 5 mg by mouth daily. Indications: Anxiousness associated with Depression   Yes Provider, Historical   oxymetazoline (AFRIN) 0.05 % nasal spray 2 Sprays by Both Nostrils route two (2) times a day. Yes Provider, Historical   ibuprofen (MOTRIN) 200 mg tablet Take 600 mg by mouth every eight (8) hours as needed for Pain. Indications: pain   Yes Provider, Historical   pseudoephedrine (SUDAFED) 30 mg tablet Take 60 mg by mouth every four (4) hours as needed for Congestion. Yes Provider, Historical       Allergies   Allergen Reactions    Gluten Other (comments)    Prednisolone Other (comments)     Tachycardia; hives; SOB        Review of Systems:  A comprehensive review of systems was negative except for that written in the History of Present Illness. Specifically no visual disturbance, focal neurologic symptoms. Objective:     Visit Vitals  /68 (BP 1 Location: Right arm, BP Patient Position: At rest)   Pulse 72   Temp 97.8 °F (36.6 °C)   Resp 16   SpO2 98%     Temp (24hrs), Av °F (36.7 °C), Min:97.3 °F (36.3 °C), Max:99 °F (37.2 °C)       Lines:  . Physical Exam:    General:  Alert, cooperative, well noursished, well developed, appears stated age   Eyes:  Sclera anicteric.  Pupils equally round and reactive to light, EOMs full, no ptosis  Face:  R facial swelling   Mouth/Throat: Mucous membranes normal, oral pharynx clear   Neck: Supple   Lungs:   Clear to auscultation bilaterally, good effort   CV:  Regular rate and rhythm,no murmur, click, rub or gallop   Abdomen:   non-distended   Extremities: No cyanosis or edema   Skin: Erythema R face   Lymph nodes: Cervical and supraclavicular normal   Musculoskeletal: No swelling or deformity   Lines/Devices:  Intact, no erythema, drainage or tenderness   Psych: Alert and oriented, normal mood affect given the setting       Data Review:     CBC:  Recent Labs     01/13/20  0545 01/13/20  0000   WBC 6.2 8.8   GRANS 58 62   MONOS 10 9   EOS 1 1   ANEU 3.6 5.4   ABL 1.9 2.4   HGB 12.4 14.0   HCT 38.3 43.2    203       BMP:  Recent Labs     01/13/20  0000   CREA 1.02*   BUN 6      K 4.0      CO2 28   AGAP 6*   *       LFTS:  Recent Labs     01/13/20  0000   TBILI 0.2   ALT 58   SGOT 30   AP 72   TP 8.0   ALB 3.6       Microbiology:     All Micro Results     Procedure Component Value Units Date/Time    CULTURE, BLOOD [[id number]] Collected:  01/13/20 0012    Order Status:  Completed Specimen:  Blood Updated:  01/13/20 0042    CULTURE, BLOOD [[id number]] Collected:  01/13/20 0002    Order Status:  Completed Specimen:  Blood Updated:  01/13/20 0014          Imaging:   CT sinuses 1/11/2020    Signed By: Shantelle Verduzco MD     January 13, 2020

## 2020-01-13 NOTE — PROGRESS NOTES
Hospitalist Progress Note    2020  Admit Date: 2020  9:22 PM   NAME: Alexandre Lozano   :  2000   MRN:  [id number]   Attending: Adan Mello MD  PCP:  Deborah Umanzor MD    SUBJECTIVE:   Patient is a 20 y.o. female who is directly admitted to the hospital.  Her oral surgeon called me for direct admit for a right maxillary sinusitis/abscess. She had all of her wisdom teeth pulled about 2 weeks ago and since that time she has had persistent problems with the right upper wisdom tooth. She states the root was very close to her maxillary sinus. Since then she has had some ongoing maxillary sinusitis, occasionally with drainage through her tooth socket. She did have known chronic sinusitis prior to this. Since this time she has been on several antibiotics, currently is on clindamycin and Bactrim. She was also seen at another emergency room yesterday and given 1 dose of Rocephin. She has now been having increased swelling of her right cheek and sinus area that she reports is extending down her neck and even up into her temple. She denies any throat swelling or difficulty breathing. She does report difficulty swallowing earlier today. Her oral surgeon plans to see her and follow along with her in the hospital.  He reports no need to repeat a CT scan at this time.     Interval History (): patient examined at bedside. No events since admission. Having some nausea \"that I think is from the Dilaudid\". No current fevers/chills. No chest pain, shortness of breath, or abdominal pain. Swelling on right side of face about the same.  Scheduled for OR later today for I&D and washout    Review of Systems negative with exception of pertinent positives noted above  PHYSICAL EXAM     Visit Vitals  BP 99/60 (BP 1 Location: Right arm, BP Patient Position: At rest)   Pulse 72   Temp 97.8 °F (36.6 °C)   Resp 16   SpO2 98%      Temp (24hrs), Av °F (36.7 °C), Min:97.3 °F (36.3 °C), Max:99 °F (37.2 °C)    Oxygen Therapy  O2 Sat (%): 98 % (01/13/20 1130)  No intake or output data in the 24 hours ending 01/13/20 1640   General: No acute distress   HEENT:  Right facial swelling along right cheek that is TTP   Lungs:  CTA Bilaterally. Heart:  Regular rate and rhythm,  No murmur, rub, or gallop  Abdomen: Soft, Non distended, Non tender, Positive bowel sounds  Extremities: No cyanosis, clubbing or edema  Neurologic:  No focal deficits    ASSESSMENT      Active Hospital Problems    Diagnosis Date Noted    Acute abscess of maxillary sinus 01/13/2020    Maxillary sinusitis 01/12/2020     Plan:    # Acute abscess of maxillary sinus, based on CT imaging  - oral maxillofacial surgery following with plans for OR today  - ID following, appreciate recs  - continue ceftriaxone and clindamycin  - tailor antibiotics to intraoperative cultures  - supportive management with pain meds and antiemetics    F/E/N: maintenance fluids, replete electrolytes as needed, NPO     Ppx: SCDs for VTe    Code Status: FULL CODE    Disposition: pending clinical improvement and recommendations from surgery. Discussed with father and patient at bedside. All questions answered.        Signed By: Shahida Vazquez DO     January 13, 2020

## 2020-01-13 NOTE — H&P
HOSPITALIST H&P  NAME:  Vicki Honeycutt   Age:  20 y.o.  :   2000   MRN:   [id number]  PCP: Lilia Rico MD  Treatment Team: Attending Provider: Erick Edouard MD    No Order     CC: Reason for admission is: Rt Maxillary sinusitis/abscess    HPI:   Patient history was obtained from her oral surgeon prior to seeing the patient. Patient is a 20 y.o. female who is directly admitted to the hospital.  Her oral surgeon called me for direct admit for a right maxillary sinusitis/abscess. She had all of her wisdom teeth pulled about 2 weeks ago and since that time she has had persistent problems with the right upper wisdom tooth. She states the root was very close to her maxillary sinus. Since then she has had some ongoing maxillary sinusitis, occasionally with drainage through her tooth socket. She did have known chronic sinusitis prior to this. Since this time she has been on several antibiotics, currently is on clindamycin and Bactrim. She was also seen at another emergency room yesterday and given 1 dose of Rocephin. She has now been having increased swelling of her right cheek and sinus area that she reports is extending down her neck and even up into her temple. She denies any throat swelling or difficulty breathing. She does report difficulty swallowing earlier today. Her oral surgeon plans to see her and follow along with her in the hospital.  He reports no need to repeat a CT scan at this time. ROS:  All systems have been reviewed and are negative except as stated in HPI or elsewhere. No past medical history on file. No past surgical history on file. Social History     Tobacco Use    Smoking status: Not on file   Substance Use Topics    Alcohol use: Not on file      No family history on file.     FH Reviewed and non-contributory to admitting diagnosis    Allergies not on file   None         Objective:     No intake or output data in the 24 hours ending 20 2241   Temp (24hrs), Av.3 °F (36.8 °C), Min:98.3 °F (36.8 °C), Max:98.3 °F (36.8 °C)    Oxygen Therapy  O2 Sat (%): 95 % (20)   There is no height or weight on file to calculate BMI. Patient Vitals for the past 24 hrs:   Temp Pulse Resp BP SpO2   20 98.3 °F (36.8 °C) 95 16 127/84 95 %     Physical Exam:    General:    WD and WN, No apparent distress. Head:   Normocephalic, without obvious abnormality, atraumatic. Eyes:  PERRL; EOMI; sclera normal/non-icteric  ENT:  Hearing is normal.  Oropharynx is clear with tacky mucous membranes ; she does have right-sided facial swelling most prominent around the right cheek. Resp:    Clear to auscultation bilaterally. No Wheezing or Rhonchi. Resp are even and unlabored  Heart[de-identified]  Regular rate and rhythm,  no murmur,   No LE edema  Abdomen:   Soft, non-tender. Not distended. Bowel sounds normal.  hepato-splenomegaly -none  Musc/SK: Muscle strength is good and tone normal; No cyanosis. No clubbing  Skin:     Texture, turgor normal. No significant rashes or lesions. Capillary refill < 2 sec  Neurologic: CN II - XII are grossly intact - Eye exam as noted above  Psych: Alert and oriented x 4;  Judgement and insight are normal     Data Review:   No results found for this or any previous visit (from the past 24 hour(s)). CXR Results  (Last 48 hours)    None        CT Results  (Last 48 hours)    None              Assessment and Plan: Active Hospital Problems    Diagnosis Date Noted    Maxillary sinusitis 2020     Active Problems:    Maxillary sinusitis (2020)  We will start her on IV clindamycin and IV Rocephin. Her oral surgeon  reports that cultures are pending from a prior drainage. We will go ahead and get basic labs including blood cultures and lactic acid. Will order pain medicine to include IV narcotics if necessary.   I have discussed the case with her oral surgeon, Dr. Jarad Tee, he will be following her in the hospital and real direct if ENT involvement is needed as well as deciding if he needs any further imaging. · PLAN General  ·   · Cont appropriate home meds (see MAR)  · Control symptoms (pain, n/v, fever, etc)  · Monitor appropriate labs   · DVT prophylaxis: None needed she is very low risk  · Code status: Full;  HCPOA:   · Risk: high  · Anticipated DC needs:  · Estimated LOS:  Greater than 2 midnights  · Plans discussed with patient and/or caregiver; questions answered.       Med records reviewed if applicable; findings:     Critical care time if applicable:      Signed By: Mo Pinto MD     January 12, 2020

## 2020-01-13 NOTE — PROGRESS NOTES
Interdisciplinary team rounds were held 1/13/2020 with the following team members:Care Management, Nursing, Nutrition, Pharmacy, Physical Therapy and Physician. Plan of care discussed. See clinical pathway and/or care plan for interventions and desired outcomes.

## 2020-01-14 LAB
ANION GAP SERPL CALC-SCNC: 7 MMOL/L (ref 7–16)
BASOPHILS # BLD: 0 K/UL (ref 0–0.2)
BASOPHILS NFR BLD: 0 % (ref 0–2)
BUN SERPL-MCNC: 6 MG/DL (ref 6–23)
CALCIUM SERPL-MCNC: 9.3 MG/DL (ref 8.3–10.4)
CHLORIDE SERPL-SCNC: 104 MMOL/L (ref 98–107)
CO2 SERPL-SCNC: 28 MMOL/L (ref 21–32)
CREAT SERPL-MCNC: 0.85 MG/DL (ref 0.6–1)
DIFFERENTIAL METHOD BLD: ABNORMAL
EOSINOPHIL # BLD: 0.1 K/UL (ref 0–0.8)
EOSINOPHIL NFR BLD: 2 % (ref 0.5–7.8)
ERYTHROCYTE [DISTWIDTH] IN BLOOD BY AUTOMATED COUNT: 12.2 % (ref 11.9–14.6)
GLUCOSE SERPL-MCNC: 125 MG/DL (ref 65–100)
HCT VFR BLD AUTO: 38.5 % (ref 35.8–46.3)
HGB BLD-MCNC: 12.3 G/DL (ref 11.7–15.4)
IMM GRANULOCYTES # BLD AUTO: 0 K/UL (ref 0–0.5)
IMM GRANULOCYTES NFR BLD AUTO: 0 % (ref 0–5)
LYMPHOCYTES # BLD: 0.4 K/UL (ref 0.5–4.6)
LYMPHOCYTES NFR BLD: 7 % (ref 13–44)
MCH RBC QN AUTO: 29.6 PG (ref 26.1–32.9)
MCHC RBC AUTO-ENTMCNC: 31.9 G/DL (ref 31.4–35)
MCV RBC AUTO: 92.8 FL (ref 79.6–97.8)
MONOCYTES # BLD: 0.1 K/UL (ref 0.1–1.3)
MONOCYTES NFR BLD: 1 % (ref 4–12)
NEUTS SEG # BLD: 5.7 K/UL (ref 1.7–8.2)
NEUTS SEG NFR BLD: 90 % (ref 43–78)
NRBC # BLD: 0 K/UL (ref 0–0.2)
PLATELET # BLD AUTO: 198 K/UL (ref 150–450)
PMV BLD AUTO: 10.4 FL (ref 9.4–12.3)
POTASSIUM SERPL-SCNC: 4.6 MMOL/L (ref 3.5–5.1)
RBC # BLD AUTO: 4.15 M/UL (ref 4.05–5.2)
SODIUM SERPL-SCNC: 139 MMOL/L (ref 136–145)
WBC # BLD AUTO: 6.4 K/UL (ref 4.3–11.1)

## 2020-01-14 PROCEDURE — 85025 COMPLETE CBC W/AUTO DIFF WBC: CPT

## 2020-01-14 PROCEDURE — 74011250637 HC RX REV CODE- 250/637: Performed by: DENTIST

## 2020-01-14 PROCEDURE — 74011000258 HC RX REV CODE- 258: Performed by: FAMILY MEDICINE

## 2020-01-14 PROCEDURE — 74011250637 HC RX REV CODE- 250/637: Performed by: FAMILY MEDICINE

## 2020-01-14 PROCEDURE — 74011250636 HC RX REV CODE- 250/636: Performed by: FAMILY MEDICINE

## 2020-01-14 PROCEDURE — 65270000029 HC RM PRIVATE

## 2020-01-14 PROCEDURE — 74011250636 HC RX REV CODE- 250/636: Performed by: NURSE PRACTITIONER

## 2020-01-14 PROCEDURE — 80048 BASIC METABOLIC PNL TOTAL CA: CPT

## 2020-01-14 PROCEDURE — 36415 COLL VENOUS BLD VENIPUNCTURE: CPT

## 2020-01-14 RX ORDER — HYDROMORPHONE HYDROCHLORIDE 1 MG/ML
1 INJECTION, SOLUTION INTRAMUSCULAR; INTRAVENOUS; SUBCUTANEOUS
Status: DISCONTINUED | OUTPATIENT
Start: 2020-01-14 | End: 2020-01-16 | Stop reason: HOSPADM

## 2020-01-14 RX ORDER — HYDROMORPHONE HYDROCHLORIDE 1 MG/ML
1 INJECTION, SOLUTION INTRAMUSCULAR; INTRAVENOUS; SUBCUTANEOUS
Status: DISCONTINUED | OUTPATIENT
Start: 2020-01-14 | End: 2020-01-14

## 2020-01-14 RX ORDER — HYDROCODONE BITARTRATE AND ACETAMINOPHEN 7.5; 325 MG/1; MG/1
1 TABLET ORAL
Status: DISCONTINUED | OUTPATIENT
Start: 2020-01-14 | End: 2020-01-16 | Stop reason: HOSPADM

## 2020-01-14 RX ADMIN — HYDROMORPHONE HYDROCHLORIDE 1 MG: 1 INJECTION, SOLUTION INTRAMUSCULAR; INTRAVENOUS; SUBCUTANEOUS at 23:07

## 2020-01-14 RX ADMIN — OXYMETAZOLINE HCL 2 SPRAY: 0.05 SPRAY NASAL at 09:00

## 2020-01-14 RX ADMIN — HYDROCODONE BITARTRATE AND ACETAMINOPHEN 1 TABLET: 7.5; 325 TABLET ORAL at 05:08

## 2020-01-14 RX ADMIN — HYDROMORPHONE HYDROCHLORIDE 1 MG: 1 INJECTION, SOLUTION INTRAMUSCULAR; INTRAVENOUS; SUBCUTANEOUS at 18:30

## 2020-01-14 RX ADMIN — ALPRAZOLAM 0.25 MG: 0.5 TABLET ORAL at 15:48

## 2020-01-14 RX ADMIN — Medication 10 ML: at 21:35

## 2020-01-14 RX ADMIN — HYDROCODONE BITARTRATE AND ACETAMINOPHEN 1 TABLET: 7.5; 325 TABLET ORAL at 16:59

## 2020-01-14 RX ADMIN — ESCITALOPRAM OXALATE 5 MG: 10 TABLET ORAL at 09:32

## 2020-01-14 RX ADMIN — HYDROCODONE BITARTRATE AND ACETAMINOPHEN 1 TABLET: 7.5; 325 TABLET ORAL at 21:27

## 2020-01-14 RX ADMIN — CLINDAMYCIN PHOSPHATE 600 MG: 600 INJECTION, SOLUTION INTRAVENOUS at 23:08

## 2020-01-14 RX ADMIN — IBUPROFEN 800 MG: 800 TABLET, FILM COATED ORAL at 04:01

## 2020-01-14 RX ADMIN — OXYMETAZOLINE HCL 2 SPRAY: 0.05 SPRAY NASAL at 18:02

## 2020-01-14 RX ADMIN — CEFTRIAXONE 1 G: 1 INJECTION, POWDER, FOR SOLUTION INTRAMUSCULAR; INTRAVENOUS at 01:05

## 2020-01-14 RX ADMIN — CLINDAMYCIN PHOSPHATE 600 MG: 600 INJECTION, SOLUTION INTRAVENOUS at 12:00

## 2020-01-14 RX ADMIN — DIPHENHYDRAMINE HYDROCHLORIDE 25 MG: 25 CAPSULE ORAL at 09:32

## 2020-01-14 RX ADMIN — PHENOL 1 SPRAY: 1.5 LIQUID ORAL at 21:28

## 2020-01-14 RX ADMIN — LEVONORGESTREL AND ETHINYL ESTRADIOL 1 TABLET: KIT at 09:00

## 2020-01-14 RX ADMIN — Medication 10 ML: at 06:39

## 2020-01-14 RX ADMIN — DIPHENHYDRAMINE HYDROCHLORIDE 25 MG: 25 CAPSULE ORAL at 18:00

## 2020-01-14 RX ADMIN — CLINDAMYCIN PHOSPHATE 600 MG: 600 INJECTION, SOLUTION INTRAVENOUS at 05:08

## 2020-01-14 RX ADMIN — HYDROMORPHONE HYDROCHLORIDE 0.5 MG: 1 INJECTION, SOLUTION INTRAMUSCULAR; INTRAVENOUS; SUBCUTANEOUS at 02:08

## 2020-01-14 RX ADMIN — HYDROCODONE BITARTRATE AND ACETAMINOPHEN 1 TABLET: 7.5; 325 TABLET ORAL at 12:21

## 2020-01-14 RX ADMIN — HYDROMORPHONE HYDROCHLORIDE 0.5 MG: 1 INJECTION, SOLUTION INTRAMUSCULAR; INTRAVENOUS; SUBCUTANEOUS at 08:26

## 2020-01-14 RX ADMIN — CLINDAMYCIN PHOSPHATE 600 MG: 600 INJECTION, SOLUTION INTRAVENOUS at 18:01

## 2020-01-14 RX ADMIN — Medication 10 ML: at 14:00

## 2020-01-14 RX ADMIN — HYDROMORPHONE HYDROCHLORIDE 1 MG: 1 INJECTION, SOLUTION INTRAMUSCULAR; INTRAVENOUS; SUBCUTANEOUS at 14:40

## 2020-01-14 NOTE — PROGRESS NOTES
Interdisciplinary team rounds were held 1/14/2020 with the following team members:Care Management, Nursing, Nutrition, Pharmacy, Physical Therapy and Physician. Plan of care discussed. See clinical pathway and/or care plan for interventions and desired outcomes.

## 2020-01-14 NOTE — PROGRESS NOTES
01/14/20 0512   Pain 1   Pain Scale 1 Numeric (0 - 10)   Pain Intensity 1 8   Pain Onset 1 post-op   Pain Location 1 Face   Pain Orientation 1 Right   Pain Description 1 Aching; Throbbing   Pain Intervention(s) 1 Medication (see MAR)   Norco 7.5/325 mg po given    0613: FLACC 0.  Patient resting in bed quietly

## 2020-01-14 NOTE — OP NOTES
Operative Report    Patient: Yong Castillo MRN: [id number]  SSN: [id number]    YOB: 2000  Age: 20 y.o. Sex: female       Date of Surgery: 1/13/2020     Preoperative Diagnosis: abscess     Postoperative Diagnosis: oral abscess       Surgeon(s) and Role:     Lavone Cabot, DMD - Primary    Anesthesia: General     Procedure: Procedure(s):  EXTRAORAL ABSCESS INCISION AND DRAINAGE     Procedure in Detail: Patient was intubated orally with tube secured to the left side. Patient was given   %Marcaine 1:100,000 epi x 5cc into right maxilla/mandible and soft tissue. Posterior maxillary tuberosity excision extended as well as a vestibular incision in the maxilla and an icision over the right anterior ramus. Blunt dissection from right posterior maxilla into area of swelling. Purulent material encountered and wound culture taken. Finger dissection performed to feel for loose/necrotic tissue and dissected up to infratemporal region and down towards mandible. Wound irrigated with copious normal saline. Tooth # 2 assessed. No mobility or signs of caries. Penrose drain placed into the right maxillay sinus as well as the right buccal space and infratemporal space. Drain sutured with silk suture. Chromic used to suture vestibular incision and anterior ramus incision since no infection encountered through theses incisions. Posterior maxillary wound left open to drain. Irrigation performed into the penrose drain of posterior maxilla. Throat suctioned and throat pack removed. During procedure ETT tube was replaced due to tube position change.        Estimated Blood Loss:  50cc    Tourniquet Time: * No tourniquets in log *             Specimens:   ID Type Source Tests Collected by Time Destination   1 : Right Buccal  Body Fluid Buccal Smear CULTURE, ANAEROBIC, CULTURE, FUNGUS, CULTURE, WOUND W Reuben Hendrickson DMD 1/13/2020 1852 Microbiology           Drains: Penrose drain x 2 Complications: None    Counts: Sponge and needle counts were correct times two.     Signed By:  Teto Mcghee DMD     January 13, 2020

## 2020-01-14 NOTE — ANESTHESIA POSTPROCEDURE EVALUATION
Procedure(s):  EXTRAORAL ABSCESS INCISION AND DRAINAGE.     general    Anesthesia Post Evaluation      Multimodal analgesia: multimodal analgesia used between 6 hours prior to anesthesia start to PACU discharge  Patient location during evaluation: PACU  Patient participation: complete - patient participated  Level of consciousness: awake and alert  Pain management: adequate  Airway patency: patent  Anesthetic complications: no  Cardiovascular status: acceptable  Respiratory status: acceptable  Hydration status: acceptable  Post anesthesia nausea and vomiting:  none      Vitals Value Taken Time   /66 1/13/2020  9:45 PM   Temp 36.7 °C (98.1 °F) 1/13/2020  8:08 PM   Pulse 121 1/13/2020  9:45 PM   Resp 20 1/13/2020  9:45 PM   SpO2 98 % 1/13/2020  9:45 PM

## 2020-01-14 NOTE — PERIOP NOTES
TRANSFER - OUT REPORT:    Verbal report given to Wilmer Elliott RN on Evie Lane  being transferred to [facility] for routine post - op       Report consisted of patients Situation, Background, Assessment and   Recommendations(SBAR). Information from the following report(s) SBAR, Procedure Summary, Intake/Output, MAR, Cardiac Rhythm Sinus Tach, Procedure Verification and Quality Measures was reviewed with the receiving nurse. Lines:   Peripheral IV 01/13/20 Left;Posterior Hand (Active)   Site Assessment Clean, dry, & intact 1/14/2020  8:11 AM   Phlebitis Assessment 0 1/14/2020  8:11 AM   Infiltration Assessment 0 1/14/2020  8:11 AM   Dressing Status Clean, dry, & intact 1/14/2020  8:11 AM   Dressing Type Tape;Transparent 1/14/2020  8:11 AM   Hub Color/Line Status Capped 1/14/2020  8:11 AM   Alcohol Cap Used No 1/13/2020 10:00 PM        Opportunity for questions and clarification was provided. Patient transported with:   O2 @ 2 liters    Patient and family given floor number and nurses name.

## 2020-01-14 NOTE — PROGRESS NOTES
Progress Note    2020  Admit Date: 2020  9:22 PM   NAME: Harry Litten   :  2000   MRN:  [id number]   Attending: Marlyn Gardner MD  PCP:  Ivette Amezquita MD  Treatment Team: Attending Provider: Marlyn Gardner MD; Consulting Provider: Lali Flores DMD; Consulting Provider: Kamila Delgado MD; Utilization Review: Chanelle Badillo RN; Utilization Review: Mary Martinez; Care Manager: Sima Berrios RN; Nurse Practitioner: Karan Quintana NP    Full Code   SUBJECTIVE:   Ms. Chema Zuniga is a 21 yo female who presented as a direct admit from her oral surgeons office for right maxillary sinusitis/abscess. She had all of her wisdom teeth pulled 2 weeks ago and since then has had persistent maxillary sinusitis, drainage through the root socket. She was told the root was very close to her maxillary sinus on the right. She was given clindamycin and bactrim prior to admission. Was seen at another ER for same and given rocephin prior to admit. She reports increased swelling of right cheek and sinus extending down her neck. Dr. Carlota Valles consulted with Oral Surgery and  pt underwent I/D of extraoral abscess. Pt started on rocephin and clindamycin. Intraoperative cultures obtained, following. BC NGTD. Today pt requesting to eat soft foods. Denies SOB, throat swelling. Does c/o sore throat. History reviewed. No pertinent past medical history. Recent Results (from the past 24 hour(s))   HCG URINE, QL. - POC    Collection Time: 20  4:57 PM   Result Value Ref Range    Pregnancy test,urine (POC) NEGATIVE  NEG     CULTURE, WOUND W GRAM STAIN    Collection Time: 20  6:54 PM   Result Value Ref Range    Special Requests: RIGHT  BUCCAL        GRAM STAIN 0 TO 5 WBC'S/OIF     GRAM STAIN NO DEFINITE ORGANISM SEEN      Culture result:        NO GROWTH AFTER SHORT PERIOD OF INCUBATION. FURTHER RESULTS TO FOLLOW AFTER OVERNIGHT INCUBATION.    CBC WITH AUTOMATED DIFF Collection Time: 01/14/20  5:19 AM   Result Value Ref Range    WBC 6.4 4.3 - 11.1 K/uL    RBC 4.15 4.05 - 5.2 M/uL    HGB 12.3 11.7 - 15.4 g/dL    HCT 38.5 35.8 - 46.3 %    MCV 92.8 79.6 - 97.8 FL    MCH 29.6 26.1 - 32.9 PG    MCHC 31.9 31.4 - 35.0 g/dL    RDW 12.2 11.9 - 14.6 %    PLATELET 974 782 - 386 K/uL    MPV 10.4 9.4 - 12.3 FL    ABSOLUTE NRBC 0.00 0.0 - 0.2 K/uL    DF AUTOMATED      NEUTROPHILS 90 (H) 43 - 78 %    LYMPHOCYTES 7 (L) 13 - 44 %    MONOCYTES 1 (L) 4.0 - 12.0 %    EOSINOPHILS 2 0.5 - 7.8 %    BASOPHILS 0 0.0 - 2.0 %    IMMATURE GRANULOCYTES 0 0.0 - 5.0 %    ABS. NEUTROPHILS 5.7 1.7 - 8.2 K/UL    ABS. LYMPHOCYTES 0.4 (L) 0.5 - 4.6 K/UL    ABS. MONOCYTES 0.1 0.1 - 1.3 K/UL    ABS. EOSINOPHILS 0.1 0.0 - 0.8 K/UL    ABS. BASOPHILS 0.0 0.0 - 0.2 K/UL    ABS. IMM.  GRANS. 0.0 0.0 - 0.5 K/UL   METABOLIC PANEL, BASIC    Collection Time: 01/14/20  5:19 AM   Result Value Ref Range    Sodium 139 136 - 145 mmol/L    Potassium 4.6 3.5 - 5.1 mmol/L    Chloride 104 98 - 107 mmol/L    CO2 28 21 - 32 mmol/L    Anion gap 7 7 - 16 mmol/L    Glucose 125 (H) 65 - 100 mg/dL    BUN 6 6 - 23 MG/DL    Creatinine 0.85 0.6 - 1.0 MG/DL    GFR est AA >60 >60 ml/min/1.73m2    GFR est non-AA >60 >60 ml/min/1.73m2    Calcium 9.3 8.3 - 10.4 MG/DL     Allergies   Allergen Reactions    Gluten Other (comments)    Prednisolone Other (comments)     Tachycardia; hives; SOB     Current Facility-Administered Medications   Medication Dose Route Frequency Provider Last Rate Last Dose    HYDROcodone-acetaminophen (NORCO) 7.5-325 mg per tablet 1 Tab  1 Tab Oral Q4H PRN Dilshad LOVING DMD   1 Tab at 01/14/20 1221    HYDROmorphone (PF) (DILAUDID) injection 1 mg  1 mg IntraVENous Q4H PRN Elidia LOVING, NP        sodium chloride (NS) flush 5-40 mL  5-40 mL IntraVENous Q8H Jason Oconnor MD   10 mL at 01/14/20 0639    sodium chloride (NS) flush 5-40 mL  5-40 mL IntraVENous PRN Jason Oconnor MD Rayvon Easterly acetaminophen (TYLENOL) tablet 650 mg  650 mg Oral Q4H PRN Sharmaine Oconnor MD        naloxone [facility]) injection 0.4 mg  0.4 mg IntraVENous PRN Cliff Haskins MD        diphenhydrAMINE (BENADRYL) capsule 25 mg  25 mg Oral Q6H PRN Cliff Haskins MD   25 mg at 20 0932    ondansetron (ZOFRAN) injection 4 mg  4 mg IntraVENous Q4H PRN Cliff Haskins MD   4 mg at 20 1137    bisacodyl (DULCOLAX) tablet 5 mg  5 mg Oral DAILY PRN Cliff Haskins MD        clindamycin (CLEOCIN) 600mg D5W 50mL IVPB (premix)  600 mg IntraVENous Q6H Cliff Haskins  mL/hr at 20 0508 600 mg at 20 0508    cefTRIAXone (ROCEPHIN) 1 g in 0.9% sodium chloride (MBP/ADV) 50 mL  1 g IntraVENous Q24H Cliff Haskins  mL/hr at 20 0105 1 g at 20 0105    escitalopram oxalate (LEXAPRO) tablet 5 mg  5 mg Oral DAILY Cliff Haskins MD   5 mg at 20 0932    ibuprofen (MOTRIN) tablet 800 mg  800 mg Oral Q8H PRN Cliff Haskins MD   800 mg at 20 0401    levonorgestrel-ethinyl estradiol (AVIANE, ALESSE, LESSINA) 0.1-20 mg-mcg per tablet 1 Tab (Patient Supplied)  1 Tab Oral DAILY Cliff Haskins MD   Stopped at 20 0828    oxymetazoline (AFRIN) 0.05 % nasal spray 2 Spray  2 Spray Both Nostrils BID Cliff Haskins MD   2 [place] at 20 0825    ALPRAZolam Ed Jetty) tablet 0.25 mg  0.25 mg Oral QID PRN Cliff Haskins MD   0.25 mg at 20 2301       Review of Systems negative with exception of pertinent positives noted above  PHYSICAL EXAM     Visit Vitals  /66 (BP 1 Location: Right arm, BP Patient Position: At rest)   Pulse 84   Temp 98 °F (36.7 °C)   Resp 20   SpO2 96%      Temp (24hrs), Av.1 °F (36.7 °C), Min:97 °F (36.1 °C), Max:98.7 °F (37.1 °C)    Oxygen Therapy  O2 Sat (%): 96 % (20 1217)  O2 Device: Nasal cannula (20)  O2 Flow Rate (L/min): 2 l/min (20)    Intake/Output Summary (Last 24 hours) at 1/14/2020 1414  Last data filed at 1/13/2020 2145  Gross per 24 hour   Intake 1900 ml   Output 50 ml   Net 1850 ml      General: No acute distress   HEENT: Swelling bilateral mandibles right >left. Right side swelling extending into neck. No tracheal deviation. Minimal ability to open mouth to visualize oral cavity.    Lungs: CTA bilaterally. resp even and nonlabored  Heart:  S1S2 present without murmurs rubs gallops. RRR. No LE edema  Abdomen: Soft, non tender, non distended. BS present  Extremities: Moves ext spontaneously. No cyanosis. Neurologic:  A/O X4.  No focal deficits    Results summary of Diagnostic Studies/Procedures copied from within [facility] EMR:      [place] 96 Problems    Diagnosis Date Noted    Acute abscess of maxillary sinus 01/13/2020    Maxillary sinusitis 01/12/2020     Plan:    Maxillary sinusitis/acute abscess of maxillary sinu  S/p I/D 1/13/20  Follow operative cx  BC NGTD  Continue rocephin and clindamycin  Restart IVF until pt taking po fluids  Increase to mechanical soft diet      Notes, labs, VS, diagnostic testing reviewed  Time spent with pt 35 min      DVT Prophylaxis: SCDs  Plan of Care Discussed with: Supervising MD Dr. Flory Tinajero, care team, pt, mother at bedside      Paul Isidro NP

## 2020-01-14 NOTE — PROGRESS NOTES
TRANSFER - IN REPORT:    Verbal report received from [address] [place] [address] Street on Eliseo Holden  being received from PACU  for routine post - op      Report consisted of patients Situation, Background, Assessment and   Recommendations(SBAR). Information from the following report(s) SBAR, Kardex, OR Summary, Intake/Output, MAR and Recent Results was reviewed with the receiving nurse. Opportunity for questions and clarification was provided. Assessment completed upon patients arrival to unit and care assumed.

## 2020-01-14 NOTE — PROGRESS NOTES
Problem: Falls - Risk of  Goal: *Absence of Falls  Description  Document Nereida Farley Fall Risk and appropriate interventions in the flowsheet.   Outcome: Progressing Towards Goal  Note: Fall Risk Interventions:            Medication Interventions: Patient to call before getting OOB                   Problem: Patient Education: Go to Patient Education Activity  Goal: Patient/Family Education  Outcome: Progressing Towards Goal     Problem: Pain  Goal: *Control of Pain  Outcome: Progressing Towards Goal     Problem: Patient Education: Go to Patient Education Activity  Goal: Patient/Family Education  Outcome: Progressing Towards Goal     Problem: Nausea/Vomiting (Adult)  Goal: *Absence of nausea/vomiting  Outcome: Progressing Towards Goal     Problem: Patient Education: Go to Patient Education Activity  Goal: Patient/Family Education  Outcome: Progressing Towards Goal     Problem: Anxiety  Goal: *Alleviation of anxiety  Outcome: Progressing Towards Goal     Problem: Patient Education: Go to Patient Education Activity  Goal: Patient/Family Education  Outcome: Progressing Towards Goal

## 2020-01-14 NOTE — PROGRESS NOTES
Patient alert and oriented x 4. Patient is drowsy but open eyes spontaneously, patient is anxious. Respirations even and unlabored. 02@ 2L on via nasal cannula. Pain rated 6/10. Facial edema present. Ecchymosis to right side of lip and face. Family at bedside. Call light in reach. Bed in low position. Denies any needs at this time.

## 2020-01-14 NOTE — PROGRESS NOTES
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 Anti-infectives:   · Ceftriaxone  · Clindamycin    Subjective:   Swelling is down in her face. She is very emotional right now. Afebrile and tolerating antibiotics without side effects. Allergies   Allergen Reactions    Gluten Other (comments)    Prednisolone Other (comments)     Tachycardia; hives; SOB        Review of Systems:  A comprehensive review of systems was negative except for that written in the History of Present Illness. Specifically no visual disturbance, focal neurologic symptoms. Objective:     Visit Vitals  /66 (BP 1 Location: Right arm, BP Patient Position: At rest)   Pulse 84   Temp 98 °F (36.7 °C)   Resp 20   SpO2 96%     Temp (24hrs), Av.1 °F (36.7 °C), Min:97 °F (36.1 °C), Max:98.7 °F (37.1 °C)       Lines:  . Physical Exam:    General:  Alert, cooperative, well nourished, well developed, appears stated age   Eyes:  Sclera anicteric.  Pupils equally round and reactive to light, EOMs full, no ptosis  Face:  R facial swelling is improved   Mouth/Throat: Mucous membranes normal, oral pharynx clear   Neck: Supple   Lungs:   Clear to auscultation bilaterally, good effort   CV:  Regular rate and rhythm,no murmur, click, rub or gallop   Abdomen:   non-distended   Extremities: No cyanosis or edema   Skin: Erythema R face   Lymph nodes: Cervical and supraclavicular normal   Musculoskeletal: No swelling or deformity   Lines/Devices:  Intact, no erythema, drainage or tenderness   Psych: Alert and oriented, normal mood affect given the setting       Data Review:     CBC:  Recent Labs     01/14/20  0519 01/13/20  0545 01/13/20  0000   WBC 6.4 6.2 8.8   GRANS 90* 58 62   MONOS 1* 10 9   EOS 2 1 1   ANEU 5.7 3.6 5.4   ABL 0.4* 1.9 2.4   HGB 12.3 12.4 14.0   HCT 38.5 38.3 43.2    189 203       BMP:  Recent Labs     01/14/20  0519 01/13/20  0000   CREA 0.85 1.02*   BUN 6 6    138   K 4.6 4.0    104   CO2 28 28   AGAP 7 6*   * 102*       LFTS:  Recent Labs     01/13/20  0000   TBILI 0.2   ALT 58   SGOT 30   AP 72   TP 8.0   ALB 3.6       Microbiology:     All Micro Results     Procedure Component Value Units Date/Time    CULTURE, Mau Nur STAIN [[id number]] Collected:  01/13/20 1854    Order Status:  Completed Specimen:  Abscess Updated:  01/14/20 0850     Special Requests: --        RIGHT  BUCCAL       GRAM STAIN 0 TO 5 WBC'S/OIF      NO DEFINITE ORGANISM SEEN        Culture result:       NO GROWTH AFTER SHORT PERIOD OF INCUBATION. FURTHER RESULTS TO FOLLOW AFTER OVERNIGHT INCUBATION.           CULTURE, BLOOD [[id number]] Collected:  01/13/20 0002    Order Status:  Completed Specimen:  Blood Updated:  01/14/20 0804     Special Requests: --        NO SPECIAL REQUESTS  RIGHT  Antecubital       Culture result: NO GROWTH 1 DAY       CULTURE, BLOOD [[id number]] Collected:  01/13/20 0012    Order Status:  Completed Specimen:  Blood Updated:  01/14/20 0804     Special Requests: --        NO SPECIAL REQUESTS  RIGHT  ARM       Culture result: NO GROWTH 1 DAY       CULTURE, ANAEROBIC [[id number]] Collected:  01/13/20 1854    Order Status:  Completed Updated:  01/13/20 2246    FUNGUS CULTURE AND SMEAR [[id number]] Collected:  01/13/20 1854    Order Status:  Completed Updated:  01/13/20 2038    CULTURE, BODY FLUID Olgasherie Winchester [[id number]] Collected:  01/13/20 1854    Order Status:  Canceled           Imaging: CT sinuses 1/11/2020  IMPRESSION:  1.  Odontogenic abscess arising from the right maxillary wisdom tooth extraction site with a narrow sinus tract that extends to the 3.0 x 2.1 cm abscess located in the right mandibular subcutaneous fat just anterior to the right masseter muscle. 2.  Right maxillary sinusitis with direct communication with the right maxillary was injected to the extraction site. 3.  Mild reactive submandibular adenopathy.     Signed By: Coley Olszewski, MD     January 14, 2020

## 2020-01-15 LAB
ALBUMIN SERPL-MCNC: 2.9 G/DL (ref 3.5–5)
ALBUMIN/GLOB SERPL: 0.7 {RATIO} (ref 1.2–3.5)
ALP SERPL-CCNC: 65 U/L (ref 50–136)
ALT SERPL-CCNC: 52 U/L (ref 12–65)
ANION GAP SERPL CALC-SCNC: 4 MMOL/L (ref 7–16)
AST SERPL-CCNC: 41 U/L (ref 15–37)
BASOPHILS # BLD: 0 K/UL (ref 0–0.2)
BASOPHILS NFR BLD: 0 % (ref 0–2)
BILIRUB SERPL-MCNC: 2 MG/DL (ref 0.2–1.1)
BUN SERPL-MCNC: 7 MG/DL (ref 6–23)
CALCIUM SERPL-MCNC: 8.9 MG/DL (ref 8.3–10.4)
CHLORIDE SERPL-SCNC: 106 MMOL/L (ref 98–107)
CO2 SERPL-SCNC: 30 MMOL/L (ref 21–32)
CREAT SERPL-MCNC: 0.82 MG/DL (ref 0.6–1)
DIFFERENTIAL METHOD BLD: ABNORMAL
EOSINOPHIL # BLD: 0 K/UL (ref 0–0.8)
EOSINOPHIL NFR BLD: 0 % (ref 0.5–7.8)
ERYTHROCYTE [DISTWIDTH] IN BLOOD BY AUTOMATED COUNT: 12.6 % (ref 11.9–14.6)
GLOBULIN SER CALC-MCNC: 4 G/DL (ref 2.3–3.5)
GLUCOSE SERPL-MCNC: 97 MG/DL (ref 65–100)
HCT VFR BLD AUTO: 38.2 % (ref 35.8–46.3)
HGB BLD-MCNC: 12.2 G/DL (ref 11.7–15.4)
IMM GRANULOCYTES # BLD AUTO: 0 K/UL (ref 0–0.5)
IMM GRANULOCYTES NFR BLD AUTO: 0 % (ref 0–5)
LYMPHOCYTES # BLD: 2.4 K/UL (ref 0.5–4.6)
LYMPHOCYTES NFR BLD: 24 % (ref 13–44)
MCH RBC QN AUTO: 29.9 PG (ref 26.1–32.9)
MCHC RBC AUTO-ENTMCNC: 31.9 G/DL (ref 31.4–35)
MCV RBC AUTO: 93.6 FL (ref 79.6–97.8)
MONOCYTES # BLD: 0.7 K/UL (ref 0.1–1.3)
MONOCYTES NFR BLD: 7 % (ref 4–12)
NEUTS SEG # BLD: 7.1 K/UL (ref 1.7–8.2)
NEUTS SEG NFR BLD: 69 % (ref 43–78)
NRBC # BLD: 0 K/UL (ref 0–0.2)
PLATELET # BLD AUTO: 217 K/UL (ref 150–450)
PMV BLD AUTO: 10.1 FL (ref 9.4–12.3)
POTASSIUM SERPL-SCNC: 4.4 MMOL/L (ref 3.5–5.1)
PROT SERPL-MCNC: 6.9 G/DL (ref 6.3–8.2)
RBC # BLD AUTO: 4.08 M/UL (ref 4.05–5.2)
SODIUM SERPL-SCNC: 140 MMOL/L (ref 136–145)
WBC # BLD AUTO: 10.2 K/UL (ref 4.3–11.1)

## 2020-01-15 PROCEDURE — 65270000029 HC RM PRIVATE

## 2020-01-15 PROCEDURE — 80053 COMPREHEN METABOLIC PANEL: CPT

## 2020-01-15 PROCEDURE — 74011250636 HC RX REV CODE- 250/636: Performed by: NURSE PRACTITIONER

## 2020-01-15 PROCEDURE — 85025 COMPLETE CBC W/AUTO DIFF WBC: CPT

## 2020-01-15 PROCEDURE — 74011250636 HC RX REV CODE- 250/636: Performed by: FAMILY MEDICINE

## 2020-01-15 PROCEDURE — 74011000258 HC RX REV CODE- 258: Performed by: FAMILY MEDICINE

## 2020-01-15 PROCEDURE — 74011250637 HC RX REV CODE- 250/637: Performed by: DENTIST

## 2020-01-15 PROCEDURE — 74011250637 HC RX REV CODE- 250/637: Performed by: FAMILY MEDICINE

## 2020-01-15 PROCEDURE — 36415 COLL VENOUS BLD VENIPUNCTURE: CPT

## 2020-01-15 RX ORDER — PROMETHAZINE HYDROCHLORIDE 25 MG/1
25 TABLET ORAL
Status: DISCONTINUED | OUTPATIENT
Start: 2020-01-15 | End: 2020-01-16 | Stop reason: HOSPADM

## 2020-01-15 RX ADMIN — IBUPROFEN 800 MG: 800 TABLET, FILM COATED ORAL at 17:30

## 2020-01-15 RX ADMIN — Medication 10 ML: at 21:43

## 2020-01-15 RX ADMIN — CEFTRIAXONE 1 G: 1 INJECTION, POWDER, FOR SOLUTION INTRAMUSCULAR; INTRAVENOUS at 00:03

## 2020-01-15 RX ADMIN — ESCITALOPRAM OXALATE 5 MG: 10 TABLET ORAL at 09:00

## 2020-01-15 RX ADMIN — CLINDAMYCIN PHOSPHATE 600 MG: 600 INJECTION, SOLUTION INTRAVENOUS at 12:10

## 2020-01-15 RX ADMIN — HYDROCODONE BITARTRATE AND ACETAMINOPHEN 1 TABLET: 7.5; 325 TABLET ORAL at 12:10

## 2020-01-15 RX ADMIN — HYDROCODONE BITARTRATE AND ACETAMINOPHEN 1 TABLET: 7.5; 325 TABLET ORAL at 20:12

## 2020-01-15 RX ADMIN — CEFTRIAXONE 1 G: 1 INJECTION, POWDER, FOR SOLUTION INTRAMUSCULAR; INTRAVENOUS at 23:57

## 2020-01-15 RX ADMIN — IBUPROFEN 800 MG: 800 TABLET, FILM COATED ORAL at 09:00

## 2020-01-15 RX ADMIN — CLINDAMYCIN PHOSPHATE 600 MG: 600 INJECTION, SOLUTION INTRAVENOUS at 23:15

## 2020-01-15 RX ADMIN — Medication 10 ML: at 06:34

## 2020-01-15 RX ADMIN — CLINDAMYCIN PHOSPHATE 600 MG: 600 INJECTION, SOLUTION INTRAVENOUS at 17:00

## 2020-01-15 RX ADMIN — CLINDAMYCIN PHOSPHATE 600 MG: 600 INJECTION, SOLUTION INTRAVENOUS at 06:28

## 2020-01-15 RX ADMIN — HYDROMORPHONE HYDROCHLORIDE 1 MG: 1 INJECTION, SOLUTION INTRAMUSCULAR; INTRAVENOUS; SUBCUTANEOUS at 03:30

## 2020-01-15 RX ADMIN — OXYMETAZOLINE HCL 2 SPRAY: 0.05 SPRAY NASAL at 09:01

## 2020-01-15 RX ADMIN — HYDROMORPHONE HYDROCHLORIDE 1 MG: 1 INJECTION, SOLUTION INTRAMUSCULAR; INTRAVENOUS; SUBCUTANEOUS at 15:27

## 2020-01-15 RX ADMIN — HYDROMORPHONE HYDROCHLORIDE 1 MG: 1 INJECTION, SOLUTION INTRAMUSCULAR; INTRAVENOUS; SUBCUTANEOUS at 07:51

## 2020-01-15 NOTE — PROGRESS NOTES
Care Management Interventions  PCP Verified by CM: Yes  Palliative Care Criteria Met (RRAT>21 & CHF Dx)?: No(RRAT 0 Dx Maxillary Sinusitis)  Transition of Care Consult (CM Consult): Discharge Planning  Discharge Durable Medical Equipment: No(None)  Physical Therapy Consult: No  Occupational Therapy Consult: No  Speech Therapy Consult: No  Current Support Network: Other(during school lives with roommates in apartment and other time lives at parents)  Confirm Follow Up Transport: Self  Discharge Location  Discharge Placement: Home with outpatient services  Saw pt in interdisciplinarily rounds with the following disciplines: Physician, Case Management, Nursing, Dietary,Therapy and Pharmacy. The plan of care was discussed along with discharge date/ location. Pt may d/c home with a PICC and have infusions done @ [facility], once cultures rerun a final abx and duration can be determined.

## 2020-01-15 NOTE — PROGRESS NOTES
Hospitalist Progress Note    Patient: Rosa Elena Yoon MRN: [id number]  SSN: [id number]    YOB: 2000  Age: 20 y.o. Sex: female      Admit Date: 1/12/2020    LOS: 3 days     Subjective:     21 yo female who presented as a direct admit from her oral surgeons office for right maxillary sinusitis/abscess. She had all of her wisdom teeth pulled 2 weeks ago and since then has had persistent maxillary sinusitis, drainage through the root socket. She was told the root was very close to her maxillary sinus on the right. She was given clindamycin and bactrim prior to admission. Was seen at another ER for same and given rocephin prior to admit. She reports increased swelling of right cheek and sinus extending down her neck. Dr. Anisa Kramer consulted with Oral Surgery and 1/13 pt underwent I/D of extraoral abscess. Pt started on rocephin and clindamycin. Intraoperative cultures obtained, following. BC NGTD. 1/15 - Still with mouth/jaw pain. Denies CP/SOB. Denies F/C/N/V. Review of systems negative except stated above. Objective:     Visit Vitals  BP (!) 89/60 (BP 1 Location: Right arm, BP Patient Position: At rest)   Pulse 67   Temp 98.7 °F (37.1 °C)   Resp 18   SpO2 96%      Oxygen Therapy  O2 Sat (%): 96 % (01/15/20 1611)  O2 Device: Nasal cannula (01/13/20 2145)  O2 Flow Rate (L/min): 2 l/min (01/13/20 2145)      Intake and Output: No intake or output data in the 24 hours ending 01/15/20 1896      Physical Exam:   GENERAL: alert, cooperative, no distress, appears stated age  EYE: conjunctivae/corneas clear. PERRL. THROAT & NECK: normal and no erythema or exudates noted. LUNG: clear to auscultation bilaterally  HEART: regular rate and rhythm, S1S2, no murmur, no JVD  ABDOMEN: soft, non-tender, non-distended. Bowel sounds normal.   EXTREMITIES:  No edema, 2+ pedal/radial pulses bilaterally  SKIN: no rash or abnormalities  NEUROLOGIC: A&Ox3. Cranial nerves 2-12 grossly intact.     [facility] Review:  Recent Results (from the past 24 hour(s))   CBC WITH AUTOMATED DIFF    Collection Time: 01/15/20  5:20 AM   Result Value Ref Range    WBC 10.2 4.3 - 11.1 K/uL    RBC 4.08 4.05 - 5.2 M/uL    HGB 12.2 11.7 - 15.4 g/dL    HCT 38.2 35.8 - 46.3 %    MCV 93.6 79.6 - 97.8 FL    MCH 29.9 26.1 - 32.9 PG    MCHC 31.9 31.4 - 35.0 g/dL    RDW 12.6 11.9 - 14.6 %    PLATELET 090 948 - 433 K/uL    MPV 10.1 9.4 - 12.3 FL    ABSOLUTE NRBC 0.00 0.0 - 0.2 K/uL    DF AUTOMATED      NEUTROPHILS 69 43 - 78 %    LYMPHOCYTES 24 13 - 44 %    MONOCYTES 7 4.0 - 12.0 %    EOSINOPHILS 0 (L) 0.5 - 7.8 %    BASOPHILS 0 0.0 - 2.0 %    IMMATURE GRANULOCYTES 0 0.0 - 5.0 %    ABS. NEUTROPHILS 7.1 1.7 - 8.2 K/UL    ABS. LYMPHOCYTES 2.4 0.5 - 4.6 K/UL    ABS. MONOCYTES 0.7 0.1 - 1.3 K/UL    ABS. EOSINOPHILS 0.0 0.0 - 0.8 K/UL    ABS. BASOPHILS 0.0 0.0 - 0.2 K/UL    ABS. IMM. GRANS. 0.0 0.0 - 0.5 K/UL   METABOLIC PANEL, COMPREHENSIVE    Collection Time: 01/15/20  5:20 AM   Result Value Ref Range    Sodium 140 136 - 145 mmol/L    Potassium 4.4 3.5 - 5.1 mmol/L    Chloride 106 98 - 107 mmol/L    CO2 30 21 - 32 mmol/L    Anion gap 4 (L) 7 - 16 mmol/L    Glucose 97 65 - 100 mg/dL    BUN 7 6 - 23 MG/DL    Creatinine 0.82 0.6 - 1.0 MG/DL    GFR est AA >60 >60 ml/min/1.73m2    GFR est non-AA >60 >60 ml/min/1.73m2    Calcium 8.9 8.3 - 10.4 MG/DL    Bilirubin, total 2.0 (H) 0.2 - 1.1 MG/DL    ALT (SGPT) 52 12 - 65 U/L    AST (SGOT) 41 (H) 15 - 37 U/L    Alk. phosphatase 65 50 - 136 U/L    Protein, total 6.9 6.3 - 8.2 g/dL    Albumin 2.9 (L) 3.5 - 5.0 g/dL    Globulin 4.0 (H) 2.3 - 3.5 g/dL    A-G Ratio 0.7 (L) 1.2 - 3.5         Imaging:  No results found. No results found for this visit on 01/12/20. Cultures:   All Micro Results     Procedure Component Value Units Date/Time    FUNGUS CULTURE AND SMEAR [[id number]] Collected:  01/13/20 1854    Order Status:  Completed Specimen:  Miscellaneous sample Updated:  01/15/20 1436     Source ABSCESS Fungus stain Direct Inoculation     Fungus (Mycology) Culture Other source received     Comment: (NOTE)  Performed At: [address] [id number]  Urszula King MD RK:[id number]         Alexsander Mccauley [[id number]] Collected:  01/13/20 1854    Order Status:  Completed Specimen:  Abscess Updated:  01/15/20 1224     Special Requests: --        RIGHT  BUCCAL       Culture result:       SUBCULTURE IS NECESSARY TO DETERMINE PRESENCE OR ABSENCE OF ANAEROBIC BACTERIA IN THIS CULTURE. FURTHER REPORT TO FOLLOW AFTER INCUBATION OF SUBCULTURE. HOLD FOR 14 DAYS PER DR. SANCHEZ @ 0178 ON 1/14/2020    CULTURE, [place] STAIN [[id number]] Collected:  01/13/20 1854    Order Status:  Completed Specimen:  Abscess Updated:  01/15/20 0755     Special Requests: --        RIGHT  BUCCAL       GRAM STAIN 0 TO 5 WBC'S/OIF      NO DEFINITE ORGANISM SEEN        Culture result: SCANT  MIXED ORAL GREGORIA               CULTURE IN PROGRESS,FURTHER UPDATES TO FOLLOW          CULTURE, BLOOD [[id number]] Collected:  01/13/20 0002    Order Status:  Completed Specimen:  Blood Updated:  01/15/20 0301     Special Requests: --        NO SPECIAL REQUESTS  RIGHT  Antecubital       Culture result: NO GROWTH 2 DAYS       CULTURE, BLOOD [[id number]] Collected:  01/13/20 0012    Order Status:  Completed Specimen:  Blood Updated:  01/15/20 0301     Special Requests: --        NO SPECIAL REQUESTS  RIGHT  ARM       Culture result: NO GROWTH 2 DAYS       CULTURE, BODY FLUID Amy Stevenson STAIN [[id number]] Collected:  01/13/20 1854    Order Status:  Canceled           Assessment/Plan:     Principal Problem:    Acute abscess of maxillary sinus (1/13/2020)  - Continue Clinda + Ceftriaxone  - Await final culture results  - Appreciate ID's input and assistance    Active Problems:    Maxillary sinusitis (1/12/2020)  - Continue Clinda + Ceftriaxone    Dispo - Await final culture results. Will need PICC line.     DIET GI SOFT No options chosen    DVT Prophylaxis: SCDs      Signed By: Reji Rivers DO     January 15, 2020

## 2020-01-15 NOTE — PROGRESS NOTES
01/15/20 0330   Pain 1   Pain Scale 1 Numeric (0 - 10)   Pain Intensity 1 9   Pain Onset 1 post-op   Pain Location 1 Face   Pain Orientation 1 Right   Pain Description 1 Aching; Throbbing;Tightness   Pain Intervention(s) 1 Medication (see MAR)   Dilaudid 1 mg IV given    0400: Pain rated 6/10

## 2020-01-15 NOTE — PROGRESS NOTES
Eryn Villalba is 1 day S/P Incision/Drainage of Right facial infection. Complains of significant pain overnight. Pain is relieved with Norco for about 3 hours and Dilaudid . 5mg for <1hr. She is tolerating full liquid diet. Afebrile and vitals are stable. WBC WNL. Waiting on culture results. Exam: Slightly distressed. Right face feels softer, but is tender. Minimal drainage from intraoral wounds overnight. JOVAN slightly improved (~20mm). A/P: Patient is stable, Afebrile and WBC WNL. Pain not well controlled. Recommend increasing Norco frequency to q4h and increasing Dilaudid dosage to 1mg. Continue Rocephin and Clindamycin. Continue to follow Cultures. Continue CBC daily. Full liquid diet now with transition to soft diet as tolerated.   Lalihta Nation  Oral and Maxillofacial Surgery  [phone]

## 2020-01-15 NOTE — PROGRESS NOTES
Patient alert and oriented x 4. Respirations even and unlabored. Prn Norco given for pain. Right side facial edema and bruising present. No distress observed. Tolerating diet. Mother at bedside. Bed in low position. Call light in reach.

## 2020-01-15 NOTE — PROGRESS NOTES
Problem: Falls - Risk of  Goal: *Absence of Falls  Description  Document Wayne Chavez Fall Risk and appropriate interventions in the flowsheet.   Outcome: Progressing Towards Goal  Note: Fall Risk Interventions:            Medication Interventions: Teach patient to arise slowly                   Problem: Patient Education: Go to Patient Education Activity  Goal: Patient/Family Education  Outcome: Progressing Towards Goal     Problem: Pain  Goal: *Control of Pain  Outcome: Progressing Towards Goal     Problem: Patient Education: Go to Patient Education Activity  Goal: Patient/Family Education  Outcome: Progressing Towards Goal     Problem: Nausea/Vomiting (Adult)  Goal: *Absence of nausea/vomiting  Outcome: Progressing Towards Goal     Problem: Patient Education: Go to Patient Education Activity  Goal: Patient/Family Education  Outcome: Progressing Towards Goal     Problem: Anxiety  Goal: *Alleviation of anxiety  Outcome: Progressing Towards Goal     Problem: Patient Education: Go to Patient Education Activity  Goal: Patient/Family Education  Outcome: Progressing Towards Goal

## 2020-01-15 NOTE — PROGRESS NOTES
01/14/20 2128   Pain 1   Pain Scale 1 Numeric (0 - 10)   Pain Intensity 1 8   Pain Onset 1 post-op   Pain Location 1 Face   Pain Orientation 1 Right   Pain Description 1 Aching;Dull;Stabbing   Pain Intervention(s) 1 Medication (see MAR)   Norco 7.5 mg po given    2158: Pain rated 6/10

## 2020-01-15 NOTE — PROGRESS NOTES
01/14/20 2314   Pain 1   Pain Scale 1 Numeric (0 - 10)   Pain Intensity 1 9   Pain Onset 1 post-op   Pain Location 1 Face   Pain Orientation 1 Right   Pain Description 1 Aching;Stabbing; Shooting   Pain Intervention(s) 1 Medication (see MAR)   Dilaudid 1 mg IV given

## 2020-01-16 ENCOUNTER — APPOINTMENT (OUTPATIENT)
Dept: CT IMAGING | Age: 20
DRG: 137 | End: 2020-01-16
Attending: DENTIST
Payer: COMMERCIAL

## 2020-01-16 VITALS
DIASTOLIC BLOOD PRESSURE: 74 MMHG | SYSTOLIC BLOOD PRESSURE: 105 MMHG | HEART RATE: 74 BPM | TEMPERATURE: 98.1 F | OXYGEN SATURATION: 94 % | RESPIRATION RATE: 16 BRPM

## 2020-01-16 LAB
BACTERIA SPEC CULT: ABNORMAL
BACTERIA SPEC CULT: ABNORMAL
BASOPHILS # BLD: 0 K/UL (ref 0–0.2)
BASOPHILS NFR BLD: 0 % (ref 0–2)
CREAT SERPL-MCNC: 0.8 MG/DL (ref 0.6–1)
DIFFERENTIAL METHOD BLD: NORMAL
EOSINOPHIL # BLD: 0.1 K/UL (ref 0–0.8)
EOSINOPHIL NFR BLD: 1 % (ref 0.5–7.8)
ERYTHROCYTE [DISTWIDTH] IN BLOOD BY AUTOMATED COUNT: 12.6 % (ref 11.9–14.6)
FUNGUS SPEC CULT: NORMAL
FUNGUS STAIN, 188244: NORMAL
GRAM STN SPEC: ABNORMAL
GRAM STN SPEC: ABNORMAL
HCT VFR BLD AUTO: 41.9 % (ref 35.8–46.3)
HGB BLD-MCNC: 13.7 G/DL (ref 11.7–15.4)
IMM GRANULOCYTES # BLD AUTO: 0 K/UL (ref 0–0.5)
IMM GRANULOCYTES NFR BLD AUTO: 0 % (ref 0–5)
LYMPHOCYTES # BLD: 3.2 K/UL (ref 0.5–4.6)
LYMPHOCYTES NFR BLD: 44 % (ref 13–44)
MCH RBC QN AUTO: 30.6 PG (ref 26.1–32.9)
MCHC RBC AUTO-ENTMCNC: 32.7 G/DL (ref 31.4–35)
MCV RBC AUTO: 93.5 FL (ref 79.6–97.8)
MONOCYTES # BLD: 0.5 K/UL (ref 0.1–1.3)
MONOCYTES NFR BLD: 6 % (ref 4–12)
NEUTS SEG # BLD: 3.5 K/UL (ref 1.7–8.2)
NEUTS SEG NFR BLD: 48 % (ref 43–78)
NRBC # BLD: 0 K/UL (ref 0–0.2)
PLATELET # BLD AUTO: 229 K/UL (ref 150–450)
PMV BLD AUTO: 9.9 FL (ref 9.4–12.3)
RBC # BLD AUTO: 4.48 M/UL (ref 4.05–5.2)
SERVICE CMNT-IMP: ABNORMAL
SPECIMEN SOURCE: NORMAL
WBC # BLD AUTO: 7.2 K/UL (ref 4.3–11.1)

## 2020-01-16 PROCEDURE — 74011000258 HC RX REV CODE- 258: Performed by: INTERNAL MEDICINE

## 2020-01-16 PROCEDURE — 74011250637 HC RX REV CODE- 250/637: Performed by: DENTIST

## 2020-01-16 PROCEDURE — C1751 CATH, INF, PER/CENT/MIDLINE: HCPCS

## 2020-01-16 PROCEDURE — 36415 COLL VENOUS BLD VENIPUNCTURE: CPT

## 2020-01-16 PROCEDURE — 74011636320 HC RX REV CODE- 636/320: Performed by: INTERNAL MEDICINE

## 2020-01-16 PROCEDURE — 36573 INSJ PICC RS&I 5 YR+: CPT | Performed by: INTERNAL MEDICINE

## 2020-01-16 PROCEDURE — 93005 ELECTROCARDIOGRAM TRACING: CPT | Performed by: INTERNAL MEDICINE

## 2020-01-16 PROCEDURE — 85025 COMPLETE CBC W/AUTO DIFF WBC: CPT

## 2020-01-16 PROCEDURE — 74011250636 HC RX REV CODE- 250/636: Performed by: INTERNAL MEDICINE

## 2020-01-16 PROCEDURE — 74011250637 HC RX REV CODE- 250/637: Performed by: INTERNAL MEDICINE

## 2020-01-16 PROCEDURE — 82565 ASSAY OF CREATININE: CPT

## 2020-01-16 PROCEDURE — 70487 CT MAXILLOFACIAL W/DYE: CPT

## 2020-01-16 PROCEDURE — 74011250636 HC RX REV CODE- 250/636: Performed by: NURSE PRACTITIONER

## 2020-01-16 PROCEDURE — 74011250637 HC RX REV CODE- 250/637: Performed by: FAMILY MEDICINE

## 2020-01-16 RX ORDER — HYDROCODONE BITARTRATE AND ACETAMINOPHEN 7.5; 325 MG/1; MG/1
1 TABLET ORAL
Qty: 21 TAB | Refills: 0 | Status: SHIPPED | OUTPATIENT
Start: 2020-01-16 | End: 2020-01-23

## 2020-01-16 RX ORDER — HEPARIN 100 UNIT/ML
300 SYRINGE INTRAVENOUS AS NEEDED
Status: DISCONTINUED | OUTPATIENT
Start: 2020-01-16 | End: 2020-01-16 | Stop reason: HOSPADM

## 2020-01-16 RX ORDER — SODIUM CHLORIDE 0.9 % (FLUSH) 0.9 %
10 SYRINGE (ML) INJECTION
Status: COMPLETED | OUTPATIENT
Start: 2020-01-16 | End: 2020-01-16

## 2020-01-16 RX ORDER — PROMETHAZINE HYDROCHLORIDE 25 MG/1
25 TABLET ORAL
Qty: 24 TAB | Refills: 0 | Status: SHIPPED | OUTPATIENT
Start: 2020-01-16

## 2020-01-16 RX ORDER — NALOXONE HYDROCHLORIDE 4 MG/.1ML
SPRAY NASAL
Qty: 1 EACH | Refills: 0 | Status: SHIPPED | OUTPATIENT
Start: 2020-01-16

## 2020-01-16 RX ORDER — SODIUM CHLORIDE 0.9 % (FLUSH) 0.9 %
10 SYRINGE (ML) INJECTION AS NEEDED
Status: DISCONTINUED | OUTPATIENT
Start: 2020-01-16 | End: 2020-01-16 | Stop reason: HOSPADM

## 2020-01-16 RX ORDER — HEPARIN 100 UNIT/ML
300 SYRINGE INTRAVENOUS EVERY 8 HOURS
Status: DISCONTINUED | OUTPATIENT
Start: 2020-01-16 | End: 2020-01-16 | Stop reason: HOSPADM

## 2020-01-16 RX ORDER — SODIUM CHLORIDE 0.9 % (FLUSH) 0.9 %
10 SYRINGE (ML) INJECTION EVERY 8 HOURS
Status: DISCONTINUED | OUTPATIENT
Start: 2020-01-16 | End: 2020-01-16 | Stop reason: HOSPADM

## 2020-01-16 RX ADMIN — PROMETHAZINE HYDROCHLORIDE 25 MG: 25 TABLET ORAL at 13:43

## 2020-01-16 RX ADMIN — IBUPROFEN 800 MG: 800 TABLET, FILM COATED ORAL at 02:29

## 2020-01-16 RX ADMIN — Medication 300 UNITS: at 17:12

## 2020-01-16 RX ADMIN — HYDROMORPHONE HYDROCHLORIDE 1 MG: 1 INJECTION, SOLUTION INTRAMUSCULAR; INTRAVENOUS; SUBCUTANEOUS at 17:20

## 2020-01-16 RX ADMIN — ERTAPENEM SODIUM 1 G: 1 INJECTION, POWDER, LYOPHILIZED, FOR SOLUTION INTRAMUSCULAR; INTRAVENOUS at 12:04

## 2020-01-16 RX ADMIN — LEVONORGESTREL AND ETHINYL ESTRADIOL 1 TABLET: KIT at 09:00

## 2020-01-16 RX ADMIN — HYDROMORPHONE HYDROCHLORIDE 1 MG: 1 INJECTION, SOLUTION INTRAMUSCULAR; INTRAVENOUS; SUBCUTANEOUS at 12:03

## 2020-01-16 RX ADMIN — Medication 10 ML: at 16:24

## 2020-01-16 RX ADMIN — IOPAMIDOL 100 ML: 755 INJECTION, SOLUTION INTRAVENOUS at 16:24

## 2020-01-16 RX ADMIN — Medication 10 ML: at 06:32

## 2020-01-16 RX ADMIN — SODIUM CHLORIDE 100 ML: 900 INJECTION, SOLUTION INTRAVENOUS at 16:24

## 2020-01-16 RX ADMIN — HYDROCODONE BITARTRATE AND ACETAMINOPHEN 1 TABLET: 7.5; 325 TABLET ORAL at 09:04

## 2020-01-16 RX ADMIN — OXYMETAZOLINE HCL 2 SPRAY: 0.05 SPRAY NASAL at 09:00

## 2020-01-16 RX ADMIN — HYDROMORPHONE HYDROCHLORIDE 1 MG: 1 INJECTION, SOLUTION INTRAMUSCULAR; INTRAVENOUS; SUBCUTANEOUS at 01:08

## 2020-01-16 RX ADMIN — ESCITALOPRAM OXALATE 5 MG: 10 TABLET ORAL at 09:04

## 2020-01-16 RX ADMIN — ALPRAZOLAM 0.25 MG: 0.5 TABLET ORAL at 14:32

## 2020-01-16 RX ADMIN — Medication 10 ML: at 17:12

## 2020-01-16 RX ADMIN — OXYMETAZOLINE HCL 2 SPRAY: 0.05 SPRAY NASAL at 18:00

## 2020-01-16 RX ADMIN — PROMETHAZINE HYDROCHLORIDE 25 MG: 25 TABLET ORAL at 02:22

## 2020-01-16 NOTE — PROGRESS NOTES
Am assessment completed. Pt is alert and oriented x 4. Lungs clear, breathing non-labored. Bowel sounds + q 4. Mouth very swollen and painful. Mom at bedside. Continue to monitor.

## 2020-01-16 NOTE — PROGRESS NOTES
PICC Placement Note    PRE-PROCEDURE VERIFICATION  Correct Procedure: yes. Time out completed with assistant berna lawler rn and all persons present in agreement with time out. Correct Site:  yes  Temperature: Temp: 98.1 °F (36.7 °C), Temperature Source: Temp Source: Oral  Recent Labs     01/16/20  0835 01/15/20  0520   BUN  --  7   CREA 0.80 0.82    217   WBC 7.2 10.2     Allergies: Gluten and Prednisolone  Education materials for [facility] Care given to patient or family. PROCEDURE DETAIL  A single lumen PICC line was started for antibiotic therapy. The following documentation is in addition to the PICC properties in the lines/airways flowsheet :  Lot #: [id number]  xylocaine used: yes  Mid-Arm Circumference: 27.5 (cm)  Internal Catheter Length: 40 (cm)  Internal Catheter Total Length: 40 (cm)  Vein Selection for PICC:right basilic  Central Line Bundle followed yes  Complication Related to Insertion: none  Both the insertion guidewire and ECG guidewire were removed intact all ports have positive blood return and were flush well with normal saline. The location of the tip of the PICC is verified using ECG technology. The tip is in the SVC per ECG reading. See image below.                  Line is okay to use: yes

## 2020-01-16 NOTE — PROGRESS NOTES
Patient alert and oriented x 4. Respirations even and unlabored. No distress observed. Edema present to right side of face. Up ambulating in hallway with boyfriend. Denies any needs at this time.

## 2020-01-16 NOTE — PROGRESS NOTES
Patient IV had infiltrated. Rocephin was going and now arm and hand is 4+ edema and stating she cannot feel her fingers and they turning blue. Capillary refill are less than three seconds. Has sensation above elbow and patient states its feels like pressure below elbow. Patient and family requesting to seen by physician as soon as possible.  Dr. Aiden Topete notified

## 2020-01-16 NOTE — DISCHARGE SUMMARY
Hospitalist Discharge Summary     Patient ID:  Daksha Gonzalez  [id number]  59 y.o.  2000  Admit date: 1/12/2020  9:22 PM  Discharge date and time: 1/16/2020  Attending: Osmany Rockwell DO  PCP:  Rebecca Nicole MD  Treatment Team: Attending Provider: Osmany Rockwell DO; Consulting Provider: Tejas Hagen DMD; Consulting Provider: Marjorie Romero MD; Utilization Review: Kalyan Montgomery RN; Utilization Review: Jesús Grigsby; Care Manager: Korey Lopez, GERALD; Care Manager: Camelia Juárez, GERALD    Principal Diagnosis Acute abscess of maxillary sinus    Hospital Problems as of 1/16/2020 Never Reviewed          Codes Class Noted - Resolved POA    * (Principal) Acute abscess of maxillary sinus ICD-10-CM: J01.00  ICD-9-CM: 461.0  1/13/2020 - Present Yes        Maxillary sinusitis ICD-10-CM: J32.0  ICD-9-CM: 473.0  1/12/2020 - Present Yes              Hospital Course:  19 yo female who presented as a direct admit from her oral surgeons office for right maxillary sinusitis/abscess. She had all of her wisdom teeth pulled 2 weeks prior and since then had persistent maxillary sinusitis, drainage through the root socket.  She was told the root was very close to her maxillary sinus on the right.  She was given clindamycin and bactrim prior to admission.  Was seen at another ER for same and given rocephin prior to admit.  She reported increased swelling of right cheek and sinus extending down her neck.  Dr. Laly Holland consulted with Oral Surgery and 1/13 pt underwent I/D of extraoral abscess with drains placed.  Pt started on rocephin and clindamycin. Intraoperative cultures obtained, which remained negative. BC NGTD. She was changed to [facility], which she will get for at least 4 weeks. She remained stable and was discharged home.     Significant Diagnostic Studies:    Labs: Results:       Chemistry Recent Labs     01/16/20  0835 01/15/20  0520 01/14/20  0519   GLU  --  97 125*   NA  --  140 139   K  --  4.4 4.6   CL  --  106 104   CO2  --  30 28   BUN  --  7 6   CREA 0.80 0.82 0.85   CA  --  8.9 9.3   AGAP  --  4* 7   AP  --  65  --    TP  --  6.9  --    ALB  --  2.9*  --    GLOB  --  4.0*  --    AGRAT  --  0.7*  --       CBC w/Diff Recent Labs     01/16/20  0835 01/15/20  0520 01/14/20  0519   WBC 7.2 10.2 6.4   RBC 4.48 4.08 4.15   HGB 13.7 12.2 12.3   HCT 41.9 38.2 38.5    217 198   GRANS 48 69 90*   LYMPH 44 24 7*   EOS 1 0* 2      Cardiac Enzymes No results for input(s): CPK, CKND1, LEO in the last 72 hours. No lab exists for component: CKRMB, TROIP   Coagulation No results for input(s): PTP, INR, APTT, INREXT in the last 72 hours. Lipid Panel No results found for: CHOL, CHOLPOCT, CHOLX, CHLST, CHOLV, 124102, HDL, HDLP, LDL, LDLC, DLDLP, 454644, VLDLC, VLDL, TGLX, TRIGL, TRIGP, TGLPOCT, CHHD, CHHDX   BNP No results for input(s): BNPP in the last 72 hours. Liver Enzymes Recent Labs     01/15/20  0520   TP 6.9   ALB 2.9*   AP 65   SGOT 41*      Thyroid Studies No results found for: T4, T3U, TSH, TSHEXT         Imaging:  Ct Maxillofacial W Cont    Result Date: 1/16/2020  IMPRESSION: Postoperative changes in the right  space with drains in the right  space and extending into the right maxillary sinus. There is residual edema and inflammatory change in the right buccal space and right  space. No peripherally enhancing abscess is demonstrated. Microbiology/Cultures:   All Micro Results     Procedure Component Value Units Date/Time    CULTURE, [place] STAIN [[id number]]  (Abnormal) Collected:  01/13/20 4238    Order Status:  Completed Specimen:  Abscess Updated:  01/16/20 1056     Special Requests: --        RIGHT  BUCCAL       GRAM STAIN 0 TO 5 WBC'S/OIF      NO DEFINITE ORGANISM SEEN        Culture result:       SCANT STREPTOCOCCI, BETA HEMOLYTIC NOT GROUP A            SCANT  MIXED ORAL GREGORIA       FUNGUS CULTURE AND SMEAR [[id number]] Collected: 01/13/20 1854    Order Status:  Completed Specimen:  Miscellaneous sample Updated:  01/16/20 0936     Source ABSCESS        Fungus stain Direct Inoculation     Fungus (Mycology) Culture Other source received     Comment: (NOTE)  Performed At: [address] [id number]  Lauren Dobbs MD OD:[id number]         CULTURE, BLOOD [[id number]] Collected:  01/13/20 0002    Order Status:  Completed Specimen:  Blood Updated:  01/16/20 0855     Special Requests: --        NO SPECIAL REQUESTS  RIGHT  Antecubital       Culture result: NO GROWTH 3 DAYS       CULTURE, BLOOD [[id number]] Collected:  01/13/20 0012    Order Status:  Completed Specimen:  Blood Updated:  01/16/20 0855     Special Requests: --        NO SPECIAL REQUESTS  RIGHT  ARM       Culture result: NO GROWTH 3 DAYS       CULTURE, ANAEROBIC [[id number]] Collected:  01/13/20 1854    Order Status:  Completed Specimen:  Abscess Updated:  01/15/20 1224     Special Requests: --        RIGHT  BUCCAL       Culture result:       SUBCULTURE IS NECESSARY TO DETERMINE PRESENCE OR ABSENCE OF ANAEROBIC BACTERIA IN THIS CULTURE. FURTHER REPORT TO FOLLOW AFTER INCUBATION OF SUBCULTURE. HOLD FOR 14 DAYS PER DR. SANCHEZ @ 5534 ON 1/14/2020    CULTURE, BODY FLUID Corrina Holloway [[id number]] Collected:  01/13/20 1854    Order Status:  Canceled           Discharge Exam:  Visit Vitals  /70 (BP 1 Location: Left arm, BP Patient Position: At rest;Supine)   Pulse 74   Temp 98.1 °F (36.7 °C)   Resp 16   SpO2 94%     General appearance: alert, cooperative, no distress, appears stated age  Lungs: clear to auscultation bilaterally  Heart: regular rate and rhythm, S1, S2 normal, no murmur, click, rub or gallop  Abdomen: soft, non-tender.  Bowel sounds normal. No masses,  no organomegaly  Extremities: no cyanosis or edema  Neurologic: Grossly normal    Disposition: home  Discharge Condition: stable  Patient Instructions:   Current Discharge Medication List      START taking these medications    Details   ertapenem 1 gram 1 g, ADDaptor 1 Device IVPB 1 g by IntraVENous route every twenty-four (24) hours for 28 days. Qty: 28 Dose, Refills: 0      promethazine (PHENERGAN) 25 mg tablet Take 1 Tab by mouth every six (6) hours as needed for Nausea. Qty: 24 Tab, Refills: 0      HYDROcodone-acetaminophen (NORCO) 7.5-325 mg per tablet Take 1 Tab by mouth every four (4) hours as needed for Pain for up to 7 days. Max Daily Amount: 6 Tabs. Qty: 21 Tab, Refills: 0    Associated Diagnoses: Acute abscess of maxillary sinus      naloxone (NARCAN) 4 mg/actuation nasal spray Use 1 spray intranasally, then discard. Repeat with new spray every 2 min as needed for opioid overdose symptoms, alternating nostrils. Qty: 1 Each, Refills: 0         CONTINUE these medications which have NOT CHANGED    Details   ALPRAZolam (XANAX) 0.25 mg tablet Take 0.25 mg by mouth as needed for Anxiety. levonorgestrel-ethinyl estradiol (AVIANE, ALESSE, LESSINA) 0.1-20 mg-mcg tab Take 1 Tab by mouth daily. escitalopram oxalate (LEXAPRO) 5 mg tablet Take 5 mg by mouth daily. Indications: Anxiousness associated with Depression      oxymetazoline (AFRIN) 0.05 % nasal spray 2 Sprays by Both Nostrils route two (2) times a day. ibuprofen (MOTRIN) 200 mg tablet Take 600 mg by mouth every eight (8) hours as needed for Pain.  Indications: pain         STOP taking these medications       HYDROcodone-acetaminophen (NORCO) 5-325 mg per tablet Comments:   Reason for Stopping:         trimethoprim-sulfamethoxazole (BACTRIM DS) 160-800 mg per tablet Comments:   Reason for Stopping:         pseudoephedrine (SUDAFED) 30 mg tablet Comments:   Reason for Stopping:               Activity: Activity as tolerated  Diet: Regular Diet  Wound Care: As directed    Follow-up  ·   Dr. Janae Rodriguez in one week  · LISA Curry in 3-4 weeks  · Dr. Darrick Adam at scheduled follow up  Time spent to discharge patient 35 minutes  Signed:  Claudia Coyle DO  1/16/2020  5:34 PM

## 2020-01-16 NOTE — PROGRESS NOTES
Care Management Interventions  PCP Verified by CM: Yes  Palliative Care Criteria Met (RRAT>21 & CHF Dx)?: No(RRAT 0 Dx Maxillary Sinusitis)  Transition of Care Consult (CM Consult): [address]: Yes  Discharge Durable Medical Equipment: No(None)  Physical Therapy Consult: No  Occupational Therapy Consult: No  Speech Therapy Consult: No  Current Support Network: Other(during school lives with roommates in apartment and other time lives at parents)  Confirm Follow Up Transport: Self  The Patient and/or Patient Representative was Provided with a Choice of Provider and Agrees with the Discharge Plan?: Yes  Freedom of Choice List was Provided with Basic Dialogue that Supports the Patient's Individualized Plan of Care/Goals, Treatment Preferences and Shares the Quality Data Associated with the Providers?: Yes  Discharge Location  Discharge Placement: Home with home health  Pt is to have a PICC line placed, [facility] Fountain came and evaluated pt for home infusions. After speaking with pt and family, they denied the need for a [facility] RN at this time. PT may d/c this afternoon to home with family, after she goes down for a CT to confirm proper placement of PICC LINE

## 2020-01-16 NOTE — PROGRESS NOTES
Interdisciplinary team rounds were held 1/16/2020 with the following team members:Care Management, Nursing, Nutrition, Pharmacy, Physical Therapy and Physician . Plan of care discussed. See clinical pathway and/or care plan for interventions and desired outcomes.

## 2020-01-16 NOTE — PROGRESS NOTES
Problem: Falls - Risk of  Goal: *Absence of Falls  Description  Document Tahir Baldwin Fall Risk and appropriate interventions in the flowsheet.   Outcome: Progressing Towards Goal  Note: Fall Risk Interventions:            Medication Interventions: Teach patient to arise slowly                   Problem: Patient Education: Go to Patient Education Activity  Goal: Patient/Family Education  Outcome: Progressing Towards Goal     Problem: Pain  Goal: *Control of Pain  Outcome: Progressing Towards Goal     Problem: Patient Education: Go to Patient Education Activity  Goal: Patient/Family Education  Outcome: Progressing Towards Goal     Problem: Nausea/Vomiting (Adult)  Goal: *Absence of nausea/vomiting  Outcome: Progressing Towards Goal     Problem: Patient Education: Go to Patient Education Activity  Goal: Patient/Family Education  Outcome: Progressing Towards Goal     Problem: Anxiety  Goal: *Alleviation of anxiety  Outcome: Progressing Towards Goal     Problem: Patient Education: Go to Patient Education Activity  Goal: Patient/Family Education  Outcome: Progressing Towards Goal

## 2020-01-16 NOTE — PROGRESS NOTES
01/16/20 0229   Pain 1   Pain Scale 1 Numeric (0 - 10)   Pain Intensity 1 8   Pain Onset 1 post-op   Pain Location 1 Arm;Hand;Face   Pain Orientation 1 Left;Right   Pain Description 1 Aching; Throbbing   Pain Intervention(s) 1 Medication (see MAR)   Motrin 800 mg po given.  Phenergan 25 mg po given    Left arm and hand with +2 edema, warm to touch, pink color, less than 3 second capillary refill    0301: Pain rated 6/10

## 2020-01-16 NOTE — PROGRESS NOTES
01/15/20 2016   Pain 1   Pain Scale 1 Numeric (0 - 10)   Pain Intensity 1 8   Pain Onset 1 post-op   Pain Location 1 Face   Pain Orientation 1 Right   Pain Description 1 Aching; Throbbing   Pain Intervention(s) 1 Medication (see MAR)   Norco 7.5/325 mg po given    2046: Pain rated 5/10

## 2020-01-16 NOTE — PROGRESS NOTES
01/16/20 0108   Pain 1   Pain Scale 1 Numeric (0 - 10)   Pain Intensity 1 7   Pain Location 1 Arm;Hand   Pain Orientation 1 Left   Pain Description 1 Aching; Throbbing;Tightness;Tingling   Pain Intervention(s) 1 Medication (see MAR)   Dilaudid 1 mg IV given      0151: pain rated 5/10

## 2020-01-16 NOTE — DISCHARGE INSTRUCTIONS
DISCHARGE SUMMARY from Nurse    PATIENT INSTRUCTIONS:    After general anesthesia or intravenous sedation, for 24 hours or while taking prescription Narcotics:  · Limit your activities  · Do not drive and operate hazardous machinery  · Do not make important personal or business decisions  · Do  not drink alcoholic beverages  · If you have not urinated within 8 hours after discharge, please contact your surgeon on call. Report the following to your surgeon:  · Excessive pain, swelling, redness or odor of or around the surgical area  · Temperature over 100.5  · Nausea and vomiting lasting longer than 4 hours or if unable to take medications  · Any signs of decreased circulation or nerve impairment to extremity: change in color, persistent  numbness, tingling, coldness or increase pain  · Any questions    What to do at Home:  Recommended activity: Activity as tolerated, Ambulate in house, No lifting, Driving, or Strenuous exercise and No driving while on analgesics,     If you experience any of the above signs or symptoms, please follow up with MD.    *  Please give a list of your current medications to your Primary Care Provider. *  Please update this list whenever your medications are discontinued, doses are      changed, or new medications (including over-the-counter products) are added. *  Please carry medication information at all times in case of emergency situations. These are general instructions for a healthy lifestyle:    No smoking/ No tobacco products/ Avoid exposure to second hand smoke  Surgeon General's Warning:  Quitting smoking now greatly reduces serious risk to your health.     Obesity, smoking, and sedentary lifestyle greatly increases your risk for illness    A healthy diet, regular physical exercise & weight monitoring are important for maintaining a healthy lifestyle    You may be retaining fluid if you have a history of heart failure or if you experience any of the following symptoms:  Weight gain of 3 pounds or more overnight or 5 pounds in a week, increased swelling in our hands or feet or shortness of breath while lying flat in bed. Please call your doctor as soon as you notice any of these symptoms; do not wait until your next office visit. The discharge information has been reviewed with the patient and caregiver. The patient and caregiver verbalized understanding. Discharge medications reviewed with the patient and caregiver and appropriate educational materials and side effects teaching were provided.   ___________________________________________________________________________________________________________________________________

## 2020-01-16 NOTE — PROGRESS NOTES
Left arm with +2 edema, warm to touch, sensation present, able to , capillary refills less than 3 seconds. Patient and mother concerned with IV antibiotcs causing infiltration in new IV site and refused IV antibiotic until speaks with  morning physician.

## 2020-01-16 NOTE — PROGRESS NOTES
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 · She can be discharged from my perspective. Anti-infectives:   · Ceftriaxone  · Clindamycin    Subjective:   No new complaints.  + facial discomfort but no worsening pain. Allergies   Allergen Reactions    Gluten Other (comments)    Prednisolone Other (comments)     Tachycardia; hives; SOB        Review of Systems:  A comprehensive review of systems was negative except for that written in the History of Present Illness. Specifically no visual disturbance, focal neurologic symptoms. Objective:     Visit Vitals  /72 (BP 1 Location: Right arm, BP Patient Position: At rest;Supine)   Pulse 61   Temp 98 °F (36.7 °C)   Resp 16   SpO2 97%     Temp (24hrs), Av.2 °F (36.8 °C), Min:97.6 °F (36.4 °C), Max:98.9 °F (37.2 °C)       Lines:  . Physical Exam:    General:  Alert, cooperative, well nourished, well developed, appears stated age   Eyes:  Sclera anicteric.  Pupils equally round and reactive to light, EOMs full, no ptosis  Face:  R facial swelling has mostly resolved   Mouth/Throat: Mucous membranes normal, oral pharynx clear   Neck: Supple   Lungs:   Clear to auscultation bilaterally, good effort   CV:  Regular rate and rhythm,no murmur, click, rub or gallop   Abdomen:   non-distended   Extremities: No cyanosis or edema   Skin: No rash   Lymph nodes: Cervical and supraclavicular normal Musculoskeletal: No swelling or deformity   Lines/Devices:  Intact, no erythema, drainage or tenderness   Psych: Alert and oriented, normal mood affect given the setting       Data Review:     CBC:  Recent Labs     01/16/20  0835 01/15/20  0520 01/14/20  0519   WBC 7.2 10.2 6.4   GRANS 48 69 90*   MONOS 6 7 1*   EOS 1 0* 2   ANEU 3.5 7.1 5.7   ABL 3.2 2.4 0.4*   HGB 13.7 12.2 12.3   HCT 41.9 38.2 38.5    217 198       BMP:  Recent Labs     01/15/20  0520 01/14/20  0519   CREA 0.82 0.85   BUN 7 6    139   K 4.4 4.6    104   CO2 30 28   AGAP 4* 7   GLU 97 125*       LFTS:  Recent Labs     01/15/20  0520   TBILI 2.0*   ALT 52   SGOT 41*   AP 65   TP 6.9   ALB 2.9*       Microbiology:     All Micro Results     Procedure Component Value Units Date/Time    FUNGUS CULTURE AND SMEAR [[id number]] Collected:  01/13/20 1854    Order Status:  Completed Specimen:  Miscellaneous sample Updated:  01/16/20 0936     Source ABSCESS        Fungus stain Direct Inoculation     Fungus (Mycology) Culture Other source received     Comment: (NOTE)  Performed At: [address] [id number]  Barrett Mead MD ET:[id number]         CULTURE, BLOOD [[id number]] Collected:  01/13/20 0002    Order Status:  Completed Specimen:  Blood Updated:  01/16/20 0855     Special Requests: --        NO SPECIAL REQUESTS  RIGHT  Antecubital       Culture result: NO GROWTH 3 DAYS       CULTURE, BLOOD [[id number]] Collected:  01/13/20 0012    Order Status:  Completed Specimen:  Blood Updated:  01/16/20 0855     Special Requests: --        NO SPECIAL REQUESTS  RIGHT  ARM       Culture result: NO GROWTH 3 DAYS       CULTURE, Livier Garcia STAIN [[id number]] Collected:  01/13/20 1854    Order Status:  Completed Specimen:  Abscess Updated:  01/16/20 0722     Special Requests: --        RIGHT  BUCCAL       GRAM STAIN 0 TO 5 WBC'S/OIF      NO DEFINITE ORGANISM SEEN        Culture result: ORGANISM IN STUDY SCANT  MIXED ORAL GREGORIA               CULTURE IN PROGRESS,FURTHER UPDATES TO FOLLOW          CULTURE, ANAEROBIC [[id number]] Collected:  01/13/20 1854    Order Status:  Completed Specimen:  Abscess Updated:  01/15/20 1224     Special Requests: --        RIGHT  BUCCAL       Culture result:       SUBCULTURE IS NECESSARY TO DETERMINE PRESENCE OR ABSENCE OF ANAEROBIC BACTERIA IN THIS CULTURE. FURTHER REPORT TO FOLLOW AFTER INCUBATION OF SUBCULTURE. HOLD FOR 14 DAYS PER DR. SANCHEZ @ [facility] ON 1/14/2020    CULTURE, BODY FLUID Gustabo Rendon [[id number]] Collected:  01/13/20 1854    Order Status:  Canceled           Imaging:   CT sinuses 1/11/2020  IMPRESSION:  1.  Odontogenic abscess arising from the right maxillary wisdom tooth extraction site with a narrow sinus tract that extends to the 3.0 x 2.1 cm abscess located in the right mandibular subcutaneous fat just anterior to the right masseter muscle. 2.  Right maxillary sinusitis with direct communication with the right maxillary was injected to the extraction site. 3.  Mild reactive submandibular adenopathy.     Signed By: Bipin Latham MD     January 16, 2020

## 2020-01-17 LAB
ATRIAL RATE: 60 BPM
CALCULATED P AXIS, ECG09: 12 DEGREES
CALCULATED R AXIS, ECG10: 95 DEGREES
CALCULATED T AXIS, ECG11: 40 DEGREES
DIAGNOSIS, 93000: NORMAL
P-R INTERVAL, ECG05: 118 MS
Q-T INTERVAL, ECG07: 414 MS
QRS DURATION, ECG06: 76 MS
QTC CALCULATION (BEZET), ECG08: 414 MS
VENTRICULAR RATE, ECG03: 60 BPM

## 2020-01-17 NOTE — PROGRESS NOTES
Went over discharge paper work with pt. All questions answered. Pt understands all information given. PIVs taken out. No needs voiced at this time.

## 2020-01-17 NOTE — PROGRESS NOTES
[address] [facility] January 16, 2020       RE: Santana Cao      To Whom It May Concern,    This is to certify that Santana Cao may may return to school on January 20th, 2020. She has been at [place], [place] since January 12, 2020. Please feel free to contact my office if you have any questions or concerns. [phone]  Thank you for your assistance in this matter.       Sincerely,  Louie Winkler RN

## 2020-01-17 NOTE — PROGRESS NOTES
S: 3 days s/p Incision/Drainage of right facial infection. Patient continues to improve. Pain controlled on Dilaudid and Norco. Patient ok with just Norco and Ibuprofen. Pain is improving. No fevers. O: Vitals stable. Afebrile. Patient nondistressed. Some swelling of the right face, but it is soft except in right right buccal region where it is firm/hard. R cheek erythema improved. Incision/Drainage performed in this region previously, so this is to be expected. Drains in place intraorally. Opening improved. CT w/ contrast taken showing no collections/abscess. Drains are in a good position. WBC: normal.   A/P: Pt. Doing well. Still has pain but improving. No signs of infection. Afebrile. WBC normal. Drains removed. OK for discharge. Norco 7.5/325mg for pain. Ertapenem through PICC line. Pt. To follow up in my office in 2 weeks.      Rebekah Nguyen  [facility] [place]  [phone]

## 2020-01-18 LAB
BACTERIA SPEC CULT: NORMAL
BACTERIA SPEC CULT: NORMAL
SERVICE CMNT-IMP: NORMAL
SERVICE CMNT-IMP: NORMAL

## 2020-01-20 LAB
FUNGUS SMEAR, RFCO1T: NORMAL
SPECIMEN SOURCE: NORMAL

## 2020-01-22 ENCOUNTER — HOSPITAL ENCOUNTER (OUTPATIENT)
Dept: SURGERY | Age: 20
Discharge: HOME | End: 2020-01-22

## 2020-01-23 VITALS — WEIGHT: 155 LBS | BODY MASS INDEX: 27.46 KG/M2 | HEIGHT: 63 IN

## 2020-01-23 RX ORDER — CHOLECALCIFEROL (VITAMIN D3) 125 MCG
5 CAPSULE ORAL
COMMUNITY

## 2020-01-23 NOTE — PERIOP NOTES
Patient verified name and . Order for consent found in EHR and matches case posting; patient verifies procedure. incision/drainage of right facial infection via intraoral and extra oral incisions    Type 1B surgery, PAT phone assessment complete. Orders received. Labs per surgeon: none  Labs per anesthesia protocol: POC HCG on DOS. Patient answered medical/surgical history questions at their best of ability. All prior to admission medications documented in [facility]. Patient instructed to take the following medications the day of surgery according to anesthesia guidelines with a small sip of water: xanax if needed, lexapro, hydrocodone if needed and aviane. Hold all vitamins/supplements 7 days prior to surgery and NSAIDS 5 days prior to surgery. Patient instructed on the following:  Arrive at A Entrance, time of arrival to be called the day before by 1700  NPO after midnight including gum, mints, and ice chips  Responsible adult must drive patient to the hospital, stay during surgery, and patient will need supervision 24 hours after anesthesia  Use antibacterial soap in shower the night before surgery and on the morning of surgery  All piercings must be removed prior to arrival.    Leave all valuables (money and jewelry) at home but bring insurance card and ID on  DOS. Do not wear make-up, nail polish, lotions, cologne, perfumes, powders, or oil on skin. Patient teach back successful and patient demonstrates knowledge of instruction.

## 2020-02-05 ENCOUNTER — HOSPITAL ENCOUNTER (OUTPATIENT)
Dept: CT IMAGING | Age: 20
Discharge: HOME | End: 2020-02-05
Attending: INTERNAL MEDICINE
Payer: COMMERCIAL

## 2020-02-05 DIAGNOSIS — R22.0 LOCALIZED SWELLING, MASS, AND LUMP OF HEAD: ICD-10-CM

## 2020-02-05 DIAGNOSIS — M86.8X8 ABSCESS OF FACIAL BONE (HCC): ICD-10-CM

## 2020-02-05 LAB
Lab: NORMAL
REFERENCE LAB,REFLB: NORMAL
TEST DESCRIPTION:,ATST: NORMAL

## 2020-02-05 PROCEDURE — 70487 CT MAXILLOFACIAL W/DYE: CPT

## 2020-02-05 PROCEDURE — 74011000258 HC RX REV CODE- 258: Performed by: INTERNAL MEDICINE

## 2020-02-05 PROCEDURE — 74011636320 HC RX REV CODE- 636/320: Performed by: INTERNAL MEDICINE

## 2020-02-05 RX ORDER — SODIUM CHLORIDE 0.9 % (FLUSH) 0.9 %
10 SYRINGE (ML) INJECTION
Status: COMPLETED | OUTPATIENT
Start: 2020-02-05 | End: 2020-02-05

## 2020-02-05 RX ADMIN — SODIUM CHLORIDE 100 ML: 900 INJECTION, SOLUTION INTRAVENOUS at 15:08

## 2020-02-05 RX ADMIN — Medication 10 ML: at 15:07

## 2020-02-05 RX ADMIN — IOPAMIDOL 100 ML: 755 INJECTION, SOLUTION INTRAVENOUS at 15:08

## 2020-02-07 LAB
BACTERIA SPEC CULT: ABNORMAL
SERVICE CMNT-IMP: ABNORMAL

## 2020-07-11 ENCOUNTER — APPOINTMENT (OUTPATIENT)
Dept: GENERAL RADIOLOGY | Age: 20
End: 2020-07-11
Attending: EMERGENCY MEDICINE
Payer: COMMERCIAL

## 2020-07-11 ENCOUNTER — HOSPITAL ENCOUNTER (EMERGENCY)
Age: 20
Discharge: HOME | End: 2020-07-11
Attending: EMERGENCY MEDICINE
Payer: COMMERCIAL

## 2020-07-11 VITALS
BODY MASS INDEX: 27.46 KG/M2 | DIASTOLIC BLOOD PRESSURE: 71 MMHG | RESPIRATION RATE: 22 BRPM | HEART RATE: 77 BPM | WEIGHT: 155 LBS | HEIGHT: 63 IN | TEMPERATURE: 98.3 F | SYSTOLIC BLOOD PRESSURE: 118 MMHG | OXYGEN SATURATION: 97 %

## 2020-07-11 DIAGNOSIS — R55 NEAR SYNCOPE: ICD-10-CM

## 2020-07-11 DIAGNOSIS — R10.9 NONSPECIFIC ABDOMINAL PAIN: Primary | ICD-10-CM

## 2020-07-11 LAB
ALBUMIN SERPL-MCNC: 4 G/DL (ref 3.5–5)
ALBUMIN/GLOB SERPL: 0.9 {RATIO} (ref 1.2–3.5)
ALP SERPL-CCNC: 72 U/L (ref 50–136)
ALT SERPL-CCNC: 43 U/L (ref 12–65)
ANION GAP SERPL CALC-SCNC: 7 MMOL/L (ref 7–16)
AST SERPL-CCNC: 28 U/L (ref 15–37)
ATRIAL RATE: 95 BPM
BASOPHILS # BLD: 0 K/UL (ref 0–0.2)
BASOPHILS NFR BLD: 0 % (ref 0–2)
BILIRUB SERPL-MCNC: 0.3 MG/DL (ref 0.2–1.1)
BUN SERPL-MCNC: 15 MG/DL (ref 6–23)
CALCIUM SERPL-MCNC: 8.9 MG/DL (ref 8.3–10.4)
CALCULATED P AXIS, ECG09: 78 DEGREES
CALCULATED R AXIS, ECG10: 113 DEGREES
CALCULATED T AXIS, ECG11: 70 DEGREES
CHLORIDE SERPL-SCNC: 107 MMOL/L (ref 98–107)
CO2 SERPL-SCNC: 25 MMOL/L (ref 21–32)
CREAT SERPL-MCNC: 0.99 MG/DL (ref 0.6–1)
CRP SERPL-MCNC: <0.3 MG/DL (ref 0–0.9)
DIAGNOSIS, 93000: NORMAL
DIFFERENTIAL METHOD BLD: ABNORMAL
EOSINOPHIL # BLD: 0.1 K/UL (ref 0–0.8)
EOSINOPHIL NFR BLD: 1 % (ref 0.5–7.8)
ERYTHROCYTE [DISTWIDTH] IN BLOOD BY AUTOMATED COUNT: 12.9 % (ref 11.9–14.6)
GLOBULIN SER CALC-MCNC: 4.4 G/DL (ref 2.3–3.5)
GLUCOSE BLD STRIP.AUTO-MCNC: 102 MG/DL (ref 65–100)
GLUCOSE SERPL-MCNC: 96 MG/DL (ref 65–100)
HCG UR QL: NEGATIVE
HCT VFR BLD AUTO: 46.9 % (ref 35.8–46.3)
HGB BLD-MCNC: 14.9 G/DL (ref 11.7–15.4)
IMM GRANULOCYTES # BLD AUTO: 0 K/UL (ref 0–0.5)
IMM GRANULOCYTES NFR BLD AUTO: 0 % (ref 0–5)
LIPASE SERPL-CCNC: 119 U/L (ref 73–393)
LYMPHOCYTES # BLD: 2.5 K/UL (ref 0.5–4.6)
LYMPHOCYTES NFR BLD: 31 % (ref 13–44)
MCH RBC QN AUTO: 28.7 PG (ref 26.1–32.9)
MCHC RBC AUTO-ENTMCNC: 31.8 G/DL (ref 31.4–35)
MCV RBC AUTO: 90.2 FL (ref 79.6–97.8)
MONOCYTES # BLD: 0.7 K/UL (ref 0.1–1.3)
MONOCYTES NFR BLD: 9 % (ref 4–12)
NEUTS SEG # BLD: 4.6 K/UL (ref 1.7–8.2)
NEUTS SEG NFR BLD: 58 % (ref 43–78)
NRBC # BLD: 0 K/UL (ref 0–0.2)
P-R INTERVAL, ECG05: 130 MS
PLATELET # BLD AUTO: 177 K/UL (ref 150–450)
PMV BLD AUTO: 10.2 FL (ref 9.4–12.3)
POTASSIUM SERPL-SCNC: 3.8 MMOL/L (ref 3.5–5.1)
PROT SERPL-MCNC: 8.4 G/DL (ref 6.3–8.2)
Q-T INTERVAL, ECG07: 320 MS
QRS DURATION, ECG06: 74 MS
QTC CALCULATION (BEZET), ECG08: 402 MS
RBC # BLD AUTO: 5.2 M/UL (ref 4.05–5.2)
SODIUM SERPL-SCNC: 139 MMOL/L (ref 136–145)
VENTRICULAR RATE, ECG03: 95 BPM
WBC # BLD AUTO: 7.9 K/UL (ref 4.3–11.1)

## 2020-07-11 PROCEDURE — 85025 COMPLETE CBC W/AUTO DIFF WBC: CPT

## 2020-07-11 PROCEDURE — 86140 C-REACTIVE PROTEIN: CPT

## 2020-07-11 PROCEDURE — 74011000250 HC RX REV CODE- 250: Performed by: EMERGENCY MEDICINE

## 2020-07-11 PROCEDURE — 71045 X-RAY EXAM CHEST 1 VIEW: CPT

## 2020-07-11 PROCEDURE — 80053 COMPREHEN METABOLIC PANEL: CPT

## 2020-07-11 PROCEDURE — 96374 THER/PROPH/DIAG INJ IV PUSH: CPT

## 2020-07-11 PROCEDURE — 81025 URINE PREGNANCY TEST: CPT

## 2020-07-11 PROCEDURE — 99285 EMERGENCY DEPT VISIT HI MDM: CPT

## 2020-07-11 PROCEDURE — 81003 URINALYSIS AUTO W/O SCOPE: CPT

## 2020-07-11 PROCEDURE — 74011250637 HC RX REV CODE- 250/637: Performed by: EMERGENCY MEDICINE

## 2020-07-11 PROCEDURE — 82962 GLUCOSE BLOOD TEST: CPT

## 2020-07-11 PROCEDURE — 83690 ASSAY OF LIPASE: CPT

## 2020-07-11 PROCEDURE — 74011250636 HC RX REV CODE- 250/636: Performed by: EMERGENCY MEDICINE

## 2020-07-11 RX ORDER — ONDANSETRON 2 MG/ML
4 INJECTION INTRAMUSCULAR; INTRAVENOUS ONCE
Status: COMPLETED | OUTPATIENT
Start: 2020-07-11 | End: 2020-07-11

## 2020-07-11 RX ORDER — MAG HYDROX/ALUMINUM HYD/SIMETH 200-200-20
30 SUSPENSION, ORAL (FINAL DOSE FORM) ORAL
Status: COMPLETED | OUTPATIENT
Start: 2020-07-11 | End: 2020-07-11

## 2020-07-11 RX ORDER — LIDOCAINE HYDROCHLORIDE 20 MG/ML
15 SOLUTION OROPHARYNGEAL
Status: COMPLETED | OUTPATIENT
Start: 2020-07-11 | End: 2020-07-11

## 2020-07-11 RX ORDER — HYOSCYAMINE SULFATE 0.12 MG/1
0.12 TABLET SUBLINGUAL
Status: COMPLETED | OUTPATIENT
Start: 2020-07-11 | End: 2020-07-11

## 2020-07-11 RX ORDER — HYOSCYAMINE SULFATE 0.125 MG
125 TABLET ORAL
Qty: 12 TAB | Refills: 0 | Status: SHIPPED | OUTPATIENT
Start: 2020-07-11

## 2020-07-11 RX ORDER — ONDANSETRON 4 MG/1
4 TABLET, FILM COATED ORAL
Qty: 12 TAB | Refills: 0 | Status: SHIPPED | OUTPATIENT
Start: 2020-07-11

## 2020-07-11 RX ADMIN — ALUMINUM HYDROXIDE, MAGNESIUM HYDROXIDE, AND SIMETHICONE 30 ML: 200; 200; 20 SUSPENSION ORAL at 09:11

## 2020-07-11 RX ADMIN — HYOSCYAMINE SULFATE 0.12 MG: 0.12 TABLET ORAL; SUBLINGUAL at 10:01

## 2020-07-11 RX ADMIN — LIDOCAINE HYDROCHLORIDE 15 ML: 20 SOLUTION ORAL; TOPICAL at 09:11

## 2020-07-11 RX ADMIN — ONDANSETRON 4 MG: 2 INJECTION INTRAMUSCULAR; INTRAVENOUS at 09:12

## 2020-07-11 NOTE — ED NOTES
I have reviewed discharge instructions with the patient. The patient verbalized understanding. Patient left ED via Discharge Method: ambulatory to work 3rd floor with self. Opportunity for questions and clarification provided. Patient given 2 scripts. To continue your aftercare when you leave the hospital, you may receive an automated call from our care team to check in on how you are doing. This is a free service and part of our promise to provide the best care and service to meet your aftercare needs.  If you have questions, or wish to unsubscribe from this service please call [phone]. Thank you for Choosing our [employer] Emergency Department.

## 2020-07-11 NOTE — DISCHARGE INSTRUCTIONS

## 2020-07-11 NOTE — ED TRIAGE NOTES
Pt was RR on 3rd floor. Pt states she has epigastric pain for a few weeks but today was in pt room and she got SOB, pale, jittery. 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 Pt states she felt nauseated as well, but denies diaphoresis. Pt denies hx of DM. Pt reports hx of celiac disease, cholecystectomy, and infxn post oral surgery. Pt denies n/v/d, cough, fever.

## 2020-07-11 NOTE — ED PROVIDER NOTES
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 Patient states she has been having recurring episodes of epigastric abdominal pain over the past several weeks that generally last about 3 hours. Today this morning's episode was the worst she is ever had it is still present. She describes it as sharp in the epigastrium without radiation to the back but it does radiate up into the chest as well as into the lower abdomen. She does have a history of celiac disease and is is also status post cholecystectomy. She denies alcohol use or other stomach or intestinal problems. She does feel somewhat nauseated from the pain. She states when the pain started she felt lightheaded and became pale and tachycardic. The history is provided by the patient. Abdominal Pain    This is a recurrent problem. The current episode started more than 1 week ago. The problem occurs every several days. The problem has not changed since onset. The pain is associated with an unknown factor. The pain is located in the epigastric region. The quality of the pain is sharp. The pain is at a severity of 8/10. The pain is severe. Associated symptoms include nausea. Pertinent negatives include no anorexia, no fever, no belching, no diarrhea, no flatus, no hematochezia, no melena, no vomiting, no constipation, no dysuria, no frequency, no hematuria, no arthralgias, no myalgias, no trauma, no chest pain and no back pain. Nothing worsens the pain. The pain is relieved by nothing. Past medical history comments: Celiac disease. The patient's surgical history includes cholecystectomy.        Past Medical History:   Diagnosis Date    Depression with anxiety     Managed with meds     GERD (gastroesophageal reflux disease)     OTC as needed     Hashimoto's disease     inactive        Past Surgical History:   Procedure Laterality Date    HX CHOLECYSTECTOMY      HX OTHER SURGICAL      I&D of right side of mouth     HX WISDOM TEETH EXTRACTION           Family History:   Problem Relation Age of Onset    No Known Problems Mother     No Known Problems Father        Social History     Socioeconomic History    Marital status: SINGLE     Spouse name: Not on file    Number of children: Not on file    Years of education: Not on file    Highest education level: Not on file   Occupational History    Not on file   Social Needs    Financial resource strain: Not on file    Food insecurity     Worry: Not on file     Inability: Not on file    Transportation needs     Medical: Not on file     Non-medical: Not on file   Tobacco Use    Smoking status: Never Smoker    Smokeless tobacco: Never Used   Substance and Sexual Activity    Alcohol use: Yes     Comment: occas    Drug use: Not Currently    Sexual activity: Not on file   Lifestyle    Physical activity     Days per week: Not on file     Minutes per session: Not on file    Stress: Not on file   Relationships    Social connections     Talks on phone: Not on file     Gets together: Not on file     Attends Jehovah's witness service: Not on file     Active member of club or organization: Not on file     Attends meetings of clubs or organizations: Not on file     Relationship status: Not on file    Intimate partner violence     Fear of current or ex partner: Not on file     Emotionally abused: Not on file     Physically abused: Not on file     Forced sexual activity: Not on file   Other Topics Concern    Not on file   Social History Narrative    Not on file         ALLERGIES: Gluten and Prednisolone    Review of Systems   Constitutional: Negative for fever. Cardiovascular: Negative for chest pain. Gastrointestinal: Positive for abdominal pain and nausea. Negative for anorexia, constipation, diarrhea, flatus, hematochezia, melena and vomiting. Genitourinary: Negative for dysuria, frequency and hematuria.    Musculoskeletal: Negative for arthralgias, back pain and myalgias. All other systems reviewed and are negative. Vitals:    07/11/20 0853   BP: 144/81   Pulse: 97   Resp: 22   Temp: 98.3 °F (36.8 °C)   SpO2: 100%   Weight: 70.3 kg (155 lb)   Height: 5' 3\" (1.6 m)            Physical Exam  Vitals signs and nursing note reviewed. Constitutional:       General: She is not in acute distress. Appearance: Normal appearance. She is normal weight. She is not ill-appearing, toxic-appearing or diaphoretic. HENT:      Head: Normocephalic and atraumatic. Nose: Nose normal.      Mouth/Throat:      Mouth: Mucous membranes are moist.      Pharynx: Oropharynx is clear. Eyes:      Extraocular Movements: Extraocular movements intact. Conjunctiva/sclera: Conjunctivae normal.      Pupils: Pupils are equal, round, and reactive to light. Neck:      Musculoskeletal: Normal range of motion and neck supple. No muscular tenderness. Cardiovascular:      Rate and Rhythm: Normal rate and regular rhythm. Pulses: Normal pulses. Heart sounds: Normal heart sounds. Pulmonary:      Effort: Pulmonary effort is normal.      Breath sounds: Normal breath sounds. Abdominal:      General: Bowel sounds are normal. There is no distension. Palpations: Abdomen is soft. Tenderness: There is abdominal tenderness. There is no right CVA tenderness, left CVA tenderness, guarding or rebound. Comments: Patient reports epigastric tenderness, but there is no guarding or rigidity noted. Lymphadenopathy:      Cervical: No cervical adenopathy. Skin:     General: Skin is warm and dry. Capillary Refill: Capillary refill takes less than 2 seconds. Neurological:      General: No focal deficit present. Mental Status: She is alert and oriented to person, place, and time. Mental status is at baseline. Psychiatric:         Mood and Affect: Mood normal.         Behavior: Behavior normal.         Thought Content:  Thought content normal.          MDM  Number of Diagnoses or Management Options  Near syncope:   Nonspecific abdominal pain:   Diagnosis management comments: Patient reports partial improvement after GI cocktail. Will give Levsin. Labs and x-rays are all within normal limits. Amount and/or Complexity of Data Reviewed  Clinical lab tests: ordered and reviewed  Tests in the radiology section of CPT®: ordered and reviewed  Review and summarize past medical records: yes  Independent visualization of images, tracings, or specimens: yes (EKG at 0 854: Is normal sinus rhythm, rate of 95, right axis deviation, no acute ischemic changes and no ectopy noted.)    Risk of Complications, Morbidity, and/or Mortality  Presenting problems: moderate  Diagnostic procedures: moderate  Management options: moderate  General comments: Patient reports significant improvement and resolution of the pain after Levsin. She is requesting to return to work. She is cleared to return to work. [facility]     Patient Progress  Patient progress: stable         Procedures

## 2022-03-18 PROBLEM — J32.0 MAXILLARY SINUSITIS: Status: ACTIVE | Noted: 2020-01-12

## 2022-03-19 PROBLEM — J01.00 ACUTE ABSCESS OF MAXILLARY SINUS: Status: ACTIVE | Noted: 2020-01-13

## 2022-05-20 DIAGNOSIS — D70.9 NEUTROPENIA, UNSPECIFIED TYPE (HCC): Primary | ICD-10-CM

## 2022-06-03 DIAGNOSIS — D70.9 NEUTROPENIA, UNSPECIFIED TYPE (HCC): Primary | ICD-10-CM

## 2022-06-09 ENCOUNTER — OFFICE VISIT (OUTPATIENT)
Dept: ONCOLOGY | Age: 22
End: 2022-06-09
Payer: COMMERCIAL

## 2022-06-09 ENCOUNTER — HOSPITAL ENCOUNTER (OUTPATIENT)
Dept: LAB | Age: 22
Discharge: HOME | End: 2022-06-12
Payer: COMMERCIAL

## 2022-06-09 VITALS
BODY MASS INDEX: 25.69 KG/M2 | TEMPERATURE: 98.2 F | RESPIRATION RATE: 14 BRPM | WEIGHT: 145 LBS | OXYGEN SATURATION: 99 % | DIASTOLIC BLOOD PRESSURE: 82 MMHG | HEART RATE: 94 BPM | HEIGHT: 63 IN | SYSTOLIC BLOOD PRESSURE: 130 MMHG

## 2022-06-09 DIAGNOSIS — D70.9 NEUTROPENIA, UNSPECIFIED TYPE (HCC): ICD-10-CM

## 2022-06-09 DIAGNOSIS — D70.9 NEUTROPENIA, UNSPECIFIED TYPE (HCC): Primary | ICD-10-CM

## 2022-06-09 DIAGNOSIS — D69.6 DECREASED PLATELET COUNT (HCC): ICD-10-CM

## 2022-06-09 LAB
ALBUMIN SERPL-MCNC: 4.1 G/DL (ref 3.5–5)
ALBUMIN/GLOB SERPL: 1.2 {RATIO} (ref 1.2–3.5)
ALP SERPL-CCNC: 41 U/L (ref 50–136)
ALT SERPL-CCNC: 52 U/L (ref 12–65)
ANION GAP SERPL CALC-SCNC: 5 MMOL/L (ref 7–16)
APPEARANCE UR: CLEAR
AST SERPL-CCNC: 26 U/L (ref 15–37)
BASOPHILS # BLD: 0 K/UL (ref 0–0.2)
BASOPHILS NFR BLD: 1 % (ref 0–2)
BILIRUB SERPL-MCNC: 0.2 MG/DL (ref 0.2–1.1)
BILIRUB UR QL: NEGATIVE
BUN SERPL-MCNC: 13 MG/DL (ref 6–23)
CALCIUM SERPL-MCNC: 9.1 MG/DL (ref 8.3–10.4)
CHLORIDE SERPL-SCNC: 108 MMOL/L (ref 98–107)
CO2 SERPL-SCNC: 27 MMOL/L (ref 21–32)
COLOR UR: YELLOW
CREAT SERPL-MCNC: 0.9 MG/DL (ref 0.6–1)
DAT POLY-SP REAG RBC QL: NORMAL
DIFFERENTIAL METHOD BLD: ABNORMAL
EOSINOPHIL # BLD: 0 K/UL (ref 0–0.8)
EOSINOPHIL NFR BLD: 1 % (ref 0.5–7.8)
ERYTHROCYTE [DISTWIDTH] IN BLOOD BY AUTOMATED COUNT: 12.6 % (ref 11.9–14.6)
ERYTHROCYTE [SEDIMENTATION RATE] IN BLOOD: 4 MM/HR (ref 0–20)
FERRITIN SERPL-MCNC: 47 NG/ML (ref 8–388)
FOLATE SERPL-MCNC: 19.4 NG/ML (ref 3.1–17.5)
GLOBULIN SER CALC-MCNC: 3.5 G/DL (ref 2.3–3.5)
GLUCOSE SERPL-MCNC: 95 MG/DL (ref 65–100)
GLUCOSE UR STRIP.AUTO-MCNC: NEGATIVE MG/DL
HCT VFR BLD AUTO: 41.5 % (ref 35.8–46.3)
HGB BLD-MCNC: 14 G/DL (ref 11.7–15.4)
HGB RETIC QN AUTO: 38 PG (ref 29–35)
HGB UR QL STRIP: NEGATIVE
IMM GRANULOCYTES # BLD AUTO: 0 K/UL (ref 0–0.5)
IMM GRANULOCYTES NFR BLD AUTO: 0 % (ref 0–5)
IMM RETICS NFR: 7.7 % (ref 3–15.9)
IRON SATN MFR SERPL: 27 %
IRON SERPL-MCNC: 101 UG/DL (ref 35–150)
KETONES UR QL STRIP.AUTO: NEGATIVE MG/DL
LDH SERPL L TO P-CCNC: 166 U/L (ref 100–190)
LEUKOCYTE ESTERASE UR QL STRIP.AUTO: NEGATIVE
LYMPHOCYTES # BLD: 1.5 K/UL (ref 0.5–4.6)
LYMPHOCYTES NFR BLD: 36 % (ref 13–44)
MCH RBC QN AUTO: 35.6 PG (ref 26.1–32.9)
MCHC RBC AUTO-ENTMCNC: 33.7 G/DL (ref 31.4–35)
MCV RBC AUTO: 105.6 FL (ref 79.6–97.8)
MONOCYTES # BLD: 0.4 K/UL (ref 0.1–1.3)
MONOCYTES NFR BLD: 10 % (ref 4–12)
NEUTS SEG # BLD: 2.2 K/UL (ref 1.7–8.2)
NEUTS SEG NFR BLD: 52 % (ref 43–78)
NITRITE UR QL STRIP.AUTO: NEGATIVE
NRBC # BLD: 0 K/UL (ref 0–0.2)
PH UR STRIP: 5 [PH] (ref 5–9)
PHOSPHATE SERPL-MCNC: 3.3 MG/DL (ref 2.5–4.5)
PLATELET # BLD AUTO: 111 K/UL (ref 150–450)
PMV BLD AUTO: 8.7 FL (ref 9.4–12.3)
POTASSIUM SERPL-SCNC: 4 MMOL/L (ref 3.5–5.1)
PROT SERPL-MCNC: 7.6 G/DL (ref 6.3–8.2)
PROT UR STRIP-MCNC: NEGATIVE MG/DL
RBC # BLD AUTO: 3.93 M/UL (ref 4.05–5.2)
RETICS # AUTO: 0.08 M/UL (ref 0.03–0.1)
RETICS/RBC NFR AUTO: 2.1 % (ref 0.3–2)
SODIUM SERPL-SCNC: 140 MMOL/L (ref 136–145)
SP GR UR REFRACTOMETRY: >=1.03 (ref 1–1.02)
T4 FREE SERPL-MCNC: 1.2 NG/DL (ref 0.78–1.4)
TIBC SERPL-MCNC: 380 UG/DL (ref 250–450)
TSH, 3RD GENERATION: 1.41 UIU/ML (ref 0.36–3)
URATE SERPL-MCNC: 3.7 MG/DL (ref 2.6–6)
UROBILINOGEN UR QL STRIP.AUTO: 0.2 EU/DL (ref 0.2–1)
VIT B12 SERPL-MCNC: 356 PG/ML (ref 193–986)
WBC # BLD AUTO: 4.1 K/UL (ref 4.3–11.1)

## 2022-06-09 PROCEDURE — 82728 ASSAY OF FERRITIN: CPT

## 2022-06-09 PROCEDURE — 86880 COOMBS TEST DIRECT: CPT

## 2022-06-09 PROCEDURE — 84100 ASSAY OF PHOSPHORUS: CPT

## 2022-06-09 PROCEDURE — 82746 ASSAY OF FOLIC ACID SERUM: CPT

## 2022-06-09 PROCEDURE — 82607 VITAMIN B-12: CPT

## 2022-06-09 PROCEDURE — 85046 RETICYTE/HGB CONCENTRATE: CPT

## 2022-06-09 PROCEDURE — 85025 COMPLETE CBC W/AUTO DIFF WBC: CPT

## 2022-06-09 PROCEDURE — 84550 ASSAY OF BLOOD/URIC ACID: CPT

## 2022-06-09 PROCEDURE — 85652 RBC SED RATE AUTOMATED: CPT

## 2022-06-09 PROCEDURE — 83020 HEMOGLOBIN ELECTROPHORESIS: CPT

## 2022-06-09 PROCEDURE — 81003 URINALYSIS AUTO W/O SCOPE: CPT

## 2022-06-09 PROCEDURE — 83540 ASSAY OF IRON: CPT

## 2022-06-09 PROCEDURE — 84439 ASSAY OF FREE THYROXINE: CPT

## 2022-06-09 PROCEDURE — 86022 PLATELET ANTIBODIES: CPT

## 2022-06-09 PROCEDURE — 80053 COMPREHEN METABOLIC PANEL: CPT

## 2022-06-09 PROCEDURE — 84238 ASSAY NONENDOCRINE RECEPTOR: CPT

## 2022-06-09 PROCEDURE — 99205 OFFICE O/P NEW HI 60 MIN: CPT | Performed by: PEDIATRICS

## 2022-06-09 PROCEDURE — 83615 LACTATE (LD) (LDH) ENZYME: CPT

## 2022-06-09 PROCEDURE — 84443 ASSAY THYROID STIM HORMONE: CPT

## 2022-06-09 PROCEDURE — 36415 COLL VENOUS BLD VENIPUNCTURE: CPT

## 2022-06-09 RX ORDER — CYCLOBENZAPRINE HCL 10 MG
TABLET ORAL
COMMUNITY

## 2022-06-09 RX ORDER — PREGABALIN 75 MG/1
CAPSULE ORAL
COMMUNITY
Start: 2022-05-16

## 2022-06-09 RX ORDER — MULTIVIT-MIN/IRON/FOLIC ACID/K 18-600-40
CAPSULE ORAL
COMMUNITY

## 2022-06-09 RX ORDER — ATENOLOL 25 MG/1
TABLET ORAL
COMMUNITY

## 2022-06-09 RX ORDER — BELIMUMAB 200 MG/ML
SOLUTION SUBCUTANEOUS
COMMUNITY
Start: 2022-06-01

## 2022-06-09 RX ORDER — ROPINIROLE 0.25 MG/1
TABLET, FILM COATED ORAL
COMMUNITY
Start: 2022-03-25

## 2022-06-09 RX ORDER — BUPROPION HYDROBROMIDE 348 MG/1
TABLET, EXTENDED RELEASE ORAL
COMMUNITY

## 2022-06-09 RX ORDER — HYDROXYCHLOROQUINE SULFATE 200 MG/1
TABLET, FILM COATED ORAL
COMMUNITY

## 2022-06-09 RX ORDER — UBIDECARENONE 75 MG
50 CAPSULE ORAL DAILY
COMMUNITY

## 2022-06-09 ASSESSMENT — PATIENT HEALTH QUESTIONNAIRE - PHQ9
SUM OF ALL RESPONSES TO PHQ QUESTIONS 1-9: 0
1. LITTLE INTEREST OR PLEASURE IN DOING THINGS: 0
SUM OF ALL RESPONSES TO PHQ9 QUESTIONS 1 & 2: 0
2. FEELING DOWN, DEPRESSED OR HOPELESS: 0

## 2022-06-09 NOTE — PROGRESS NOTES
6 weeks  Eating bettet  Had fever and night sweats in 5 months    Patient Denies:   Fevers   Night Sweats   Chills   Weight Loss   Bone Pain   Lymphadenopathy  Patient Denies:  Nose bleeds  Gum bleeds  Bruising or petechia  Bleeding with surgery  Bleeding with accidents  Transfusions  History or free bleeding or hemophilia    Current Outpatient Medications   Medication Sig    atenolol (TENORMIN) 25 MG tablet atenolol 25 mg tablet   TAKE 1 TABLET BY MOUTH TWICE A DAY2/15/22 3 tabs a day    BENLYSTA 200 MG/ML SOAJ     buPROPion HBr (APLENZIN) 348 MG TB24 Aplenzin 348 mg tablet,extended release   TAKE 1 TABLET BY MOUTH EVERY DAY    cyclobenzaprine (FLEXERIL) 10 mg tablet cyclobenzaprine 10 mg tablet   TAKE 1 TABLET BY MOUTH EVERY DAY    esomeprazole (NEXIUM) 20 MG delayed release capsule Take 20 mg by mouth daily    hydroxychloroquine (PLAQUENIL) 200 mg tablet hydroxychloroquine 200 mg tablet   TAKE 1 TABLET BY MOUTH EVERY DAY    pregabalin (LYRICA) 75 MG capsule TAKE 1 CAPSULE BY MOUTH THREE TIMES A DAY AS DIRECTED    rOPINIRole (REQUIP) 0.25 mg tablet     Fe Bisgly-Succ-C-Thre-B12-FA (IRON-150 PO) Take by mouth    vitamin B-12 (CYANOCOBALAMIN) 100 MCG tablet Take 50 mcg by mouth daily    Cholecalciferol (VITAMIN D) 50 MCG (2000 UT) CAPS capsule Take by mouth    Cannabinoids (THC FREE PO) Take by mouth    ALPRAZolam (XANAX) 0.25 MG tablet Take 0.5 mg by mouth daily as needed.     hyoscyamine (ANASPAZ;LEVSIN) 125 MCG tablet Take 0.125 mg by mouth every 4 hours as needed    ibuprofen (ADVIL;MOTRIN) 200 MG tablet Take 600 mg by mouth every 8 hours as needed    levonorgestrel-ethinyl estradiol (AVIANE;ALESSE;LESSINA) 0.1-20 MG-MCG per tablet Take 1 tablet by mouth daily    Oxymetazoline HCl (NASAL SPRAY) 0.05 % SOLN 2 sprays by Nasal route 2 times daily as needed    promethazine (PHENERGAN) 25 MG tablet Take 25 mg by mouth every 6 hours as needed    escitalopram (LEXAPRO) 5 MG tablet Take 10 mg by mouth daily (Patient not taking: Reported on 6/9/2022)    melatonin 5 MG TABS tablet Take 5 mg by mouth (Patient not taking: Reported on 6/9/2022)    naloxone 4 MG/0.1ML LIQD nasal spray Use 1 spray intranasally, then discard. Repeat with new spray every 2 min as needed for opioid overdose symptoms, alternating nostrils. (Patient not taking: Reported on 6/9/2022)    ondansetron (ZOFRAN) 4 MG tablet Take 4 mg by mouth every 8 hours as needed (Patient not taking: Reported on 6/9/2022)     No current facility-administered medications for this visit. Past Medical History:   Diagnosis Date    Depression with anxiety     Managed with meds     GERD (gastroesophageal reflux disease)     OTC as needed     Hashimoto's disease     inactive     Seizures ([facility] [address]        Past Surgical History:   Procedure Laterality Date    CHOLECYSTECTOMY      OTHER SURGICAL HISTORY      I&D of right side of mouth     WISDOM TOOTH EXTRACTION         Family History   Problem Relation Age of Onset    No Known Problems Mother     No Known Problems Father        Social History     Tobacco Use    Smoking status: Never Smoker    Smokeless tobacco: Never Used   Vaping Use    Vaping Use: Never used   Substance Use Topics    Alcohol use: Not Currently    Drug use: Not Currently         There is no immunization history on file for this patient. Allergies   Allergen Reactions    Gluten Meal Other (See Comments)         Review of Systems   Review of Systems   Constitutional: Negative. HENT: Negative. Eyes: Negative. Respiratory: Negative. Cardiovascular: Negative. Gastrointestinal: Negative. Genitourinary: Negative. Musculoskeletal: Negative. Skin: Negative. Neurological: Negative. Endo/Heme/Allergies: Negative. Psychiatric/Behavioral: Negative.       Pain reviewed fully and addressed this visit  Med review and reconciliation addressed fully this visit  ADLs and performance level addressed, ECOG 0 unless otherwise addressed    Blood pressure 130/82, pulse 94, temperature 98.2 °F (36.8 °C), temperature source Oral, resp. rate 14, height 5' 3\" (1.6 m), weight 145 lb (65.8 kg), SpO2 99 %, not currently breastfeeding. Physical Exam:   Constitutional: Well developed, well nourished female in no acute distress, sitting comfortably   HEENT: Normocephalic and atraumatic. Oropharynx is clear, mucous membranes are moist.  Pupils are equal, round, and reactive to light. Extraocular muscles are intact. Sclerae anicteric. Neck supple without JVD. No thyromegaly present. Lymph node   No palpable submandibular, cervical, supraclavicular, axillary or inguinal lymph nodes. Skin Warm and dry. No bruising and no rash noted. No erythema. No pallor. Respiratory Lungs are clear to auscultation bilaterally without wheezes, rales or rhonchi, normal air exchange without accessory muscle use. CVS Normal rate, regular rhythm and normal S1 and S2. No murmurs, gallops, or rubs. Abdomen Soft, nontender and nondistended, normoactive bowel sounds. No palpable mass. No hepatosplenomegaly. Neuro Grossly nonfocal with no obvious sensory or motor deficits. MSK Normal range of motion in general.  No edema and no tenderness. PS ECOG = 0   Psych Appropriate mood and affect.           Hospital Outpatient Visit on 06/09/2022   Component Date Value Ref Range Status    Color, UA 06/09/2022 YELLOW    Final    Appearance 06/09/2022 CLEAR    Final    Specific Gravity, UA 06/09/2022 >=1.030  1.001 - 1.023 Final    pH, Urine 06/09/2022 5.0  5.0 - 9.0   Final    Protein, UA 06/09/2022 Negative  NEG mg/dL Final    Glucose, UA 06/09/2022 Negative  NEG mg/dL Final    Ketones, Urine 06/09/2022 Negative  NEG mg/dL Final    Bilirubin Urine 06/09/2022 Negative  NEG   Final    Blood, Urine 06/09/2022 Negative  NEG   Final    Urobilinogen, Urine 06/09/2022 0.2  0.2 - 1.0 EU/dL Final    Nitrite, Urine 06/09/2022 Negative  NEG Final    Leukocyte Esterase, Urine 06/09/2022 Negative  NEG   Final    Reticulocyte Count,Automated 06/09/2022 2.1* 0.3 - 2.0 % Final    Absolute Retic # 06/09/2022 0.0841  0.026 - 0.095 M/ul Final    Immature Retic Fraction 06/09/2022 7.7  3.0 - 15.9 % Final    Retic Hemoglobin conc. 06/09/2022 38* 29 - 35 pg Final    WBC 06/09/2022 4.1* 4.3 - 11.1 K/uL Final    RBC 06/09/2022 3.93* 4.05 - 5.2 M/uL Final    Hemoglobin 06/09/2022 14.0  11.7 - 15.4 g/dL Final    Hematocrit 06/09/2022 41.5  35.8 - 46.3 % Final    MCV 06/09/2022 105.6* 79.6 - 97.8 FL Final    MCH 06/09/2022 35.6* 26.1 - 32.9 PG Final    MCHC 06/09/2022 33.7  31.4 - 35.0 g/dL Final    RDW 06/09/2022 12.6  11.9 - 14.6 % Final    Platelets 39/67/4792 111* 150 - 450 K/uL Final    MPV 06/09/2022 8.7* 9.4 - 12.3 FL Final    nRBC 06/09/2022 0.00  0.0 - 0.2 K/uL Final    **Note: Absolute NRBC parameter is now reported with Hemogram**    Seg Neutrophils 06/09/2022 52  43 - 78 % Final    Lymphocytes 06/09/2022 36  13 - 44 % Final    Monocytes 06/09/2022 10  4.0 - 12.0 % Final    Eosinophils % 06/09/2022 1  0.5 - 7.8 % Final    Basophils 06/09/2022 1  0.0 - 2.0 % Final    Immature Granulocytes 06/09/2022 0  0.0 - 5.0 % Final    Segs Absolute 06/09/2022 2.2  1.7 - 8.2 K/UL Final    Absolute Lymph # 06/09/2022 1.5  0.5 - 4.6 K/UL Final    Absolute Mono # 06/09/2022 0.4  0.1 - 1.3 K/UL Final    Absolute Eos # 06/09/2022 0.0  0.0 - 0.8 K/UL Final    Basophils Absolute 06/09/2022 0.0  0.0 - 0.2 K/UL Final    Absolute Immature Granulocyte 06/09/2022 0.0  0.0 - 0.5 K/UL Final    Differential Type 06/09/2022 AUTOMATED    Final     Gall bladder in 2019    Radiology:  CT Results (most recent):  No results found for this or any previous visit from the past 365 days. PET Results (most recent):  No results found for this or any previous visit from the past 365 days.      ORALIA Results (most recent):  No results found for this or any previous

## 2022-06-09 NOTE — LETTER
[facility] HEMATOLOGY AND ONCOLOGY  [address] [place] [address] Way [address]  Phone: [phone]  Fax: [phone]           Jhonny Sommers MD, MD      Thank you for referring Gertrude Rhodes to the [facility] clinic at [address] [place] [address]. Please see the attached clinic note for details of Lisa Saldaña's assessment and plan.     Please don't hesitate to contact us with any questions and thank you again for the referral.    Best Regards,

## 2022-06-09 NOTE — PROGRESS NOTES
New Patient Abstract    Reason for Referral: Neutropenia, unspecified type    Referring Provider:  Vince Pedersen DO    Primary Care Provider: Yudith Leiva MD    Family History of Cancer/Hematologic Disorders:     Presenting Symptoms: fatigue, joint pain    Narrative with recent with Results/Procedures/Biopsies and Dates completed:   Ms. Sandro Samuels is a 57-year-old  female who reports to have never used tobacco products. She reports alcohol use and reports drug substance use as not currently. Her medical history reports as depression with anxiety, GERD, migraines, chronic hives, POCS, seizures, MICKI and Hashimotos disease. Her surgical history reports as cholecystectomy, I&D of mouth and wisdom teeth extraction. In May 2022, Ms. Sandro Samuels presented to Dr. Perez Portillo with [facility] for lupus evaluation secondary to joint pain with swollen lymph nodes. She reports symptoms started in September 2021. She was taking Plaquenil and Imuran however Imuran discontinued secondary to pancytopenia therefore she started Benlysta. Her May 2022 chemistry panel reported an elevated creatinine of 1.07 with normal BUN. Her May peripheral smear reported platelets as normal. Her 5/6/22 CBC reported a decreased WBC of 2.3, RBC of 3.32, and ANC of 800 with an elevated MCV of 105, MCH of 34.9 and RDW of 19.3 otherwise WNL. Her hepatitis panel reported as negative. Her Sandra iron panel from 5/2022 reported WNL. A referral was placed to hematology to further evaluate her neutropenia.     Labs                                5/6/2022              Notes from Referring Provider: n/a    Other Pertinent Information: n/a    Presented at Tumor Board: n/a

## 2022-06-09 NOTE — PATIENT INSTRUCTIONS
Patient Instructions from Today's Visit    Reason for Visit:  New patient visit for low white blood cell count/Neutropenia  Recent diagnosis of Lupus (stopped Imuran May 2022)  Hx: bruising, bone pain, enlarged lymph node, weight loss, GI disturbances, blurred vision    On Plaquinel and starting Benlysta injections 6-10-22    Plan: Your white blood cell count is mildly low (4.1)  It is improving from the last check. The decrease was likely related to being on Imuran. Its possible that it is lupus related but not as likely at this point. Your platelet count is also low (111)  This could be a delayed response to Imuran or could also be lupus related. Follow Up:  Labs in a month with rheumatolgoy (    Recent Lab Results:      Treatment Summary has been discussed and given to patient: NA        -------------------------------------------------------------------------------------------------------------------  Please call our office at [phone] if you have any  of the following symptoms:   · Fever of 100.5 or greater  · Chills  · Shortness of breath  · Swelling or pain in one leg    After office hours an answering service is available and will contact a provider for emergencies or if you are experiencing any of the above symptoms.  Patient has My Chart. My Chart log in information explained on the after visit summary printout at the [facility] [address] desk.

## 2022-06-10 LAB
GLYCOPROTEIN IV AB: NEGATIVE
HLA AB SER QL IA: NEGATIVE
PLAT GP IA/IIA AB SER QL IA: NEGATIVE
PLAT GP IB/IX AB SER QL IA: NEGATIVE
PLAT GP IIB/IIIA AB SER QL IA: NEGATIVE
TRANSFERRIN SERPL-SCNC: 24.6 NMOL/L (ref 12.2–27.3)

## 2022-06-12 LAB — PATH REV BLD -IMP: NORMAL

## 2022-06-13 LAB
HGB A MFR BLD: 95.1 % (ref 96.4–98.8)
HGB A2 MFR BLD COLUMN CHROM: 2.6 % (ref 1.8–3.2)
HGB F MFR BLD: 2.3 % (ref 0–2)
HGB FRACT BLD-IMP: ABNORMAL
HGB S MFR BLD: 0 %

## 2022-06-29 PROCEDURE — 88305 TISSUE EXAM BY PATHOLOGIST: CPT

## 2022-06-30 ENCOUNTER — HOSPITAL ENCOUNTER (OUTPATIENT)
Dept: LAB | Age: 22
Discharge: HOME | End: 2022-07-03

## 2022-10-19 ENCOUNTER — OFFICE VISIT (OUTPATIENT)
Dept: ONCOLOGY | Age: 22
End: 2022-10-19
Payer: COMMERCIAL

## 2022-10-19 ENCOUNTER — HOSPITAL ENCOUNTER (OUTPATIENT)
Dept: LAB | Age: 22
Discharge: HOME | End: 2022-10-22
Payer: COMMERCIAL

## 2022-10-19 VITALS
WEIGHT: 155.6 LBS | SYSTOLIC BLOOD PRESSURE: 107 MMHG | OXYGEN SATURATION: 94 % | TEMPERATURE: 98.3 F | BODY MASS INDEX: 27.57 KG/M2 | HEART RATE: 61 BPM | HEIGHT: 63 IN | RESPIRATION RATE: 16 BRPM | DIASTOLIC BLOOD PRESSURE: 76 MMHG

## 2022-10-19 DIAGNOSIS — D69.6 DECREASED PLATELET COUNT (HCC): ICD-10-CM

## 2022-10-19 DIAGNOSIS — M32.9 LUPUS (HCC): ICD-10-CM

## 2022-10-19 DIAGNOSIS — D69.6 DECREASED PLATELET COUNT (HCC): Primary | ICD-10-CM

## 2022-10-19 DIAGNOSIS — D70.9 NEUTROPENIA, UNSPECIFIED TYPE (HCC): ICD-10-CM

## 2022-10-19 LAB
ALBUMIN SERPL-MCNC: 3.7 G/DL (ref 3.5–5)
ALBUMIN/GLOB SERPL: 1.2 {RATIO} (ref 0.4–1.6)
ALP SERPL-CCNC: 40 U/L (ref 50–136)
ALT SERPL-CCNC: 30 U/L (ref 12–65)
ANION GAP SERPL CALC-SCNC: 4 MMOL/L (ref 2–11)
AST SERPL-CCNC: 18 U/L (ref 15–37)
BASOPHILS # BLD: 0 K/UL (ref 0–0.2)
BASOPHILS NFR BLD: 0 % (ref 0–2)
BILIRUB SERPL-MCNC: 0.3 MG/DL (ref 0.2–1.1)
BUN SERPL-MCNC: 9 MG/DL (ref 6–23)
CALCIUM SERPL-MCNC: 9.1 MG/DL (ref 8.3–10.4)
CHLORIDE SERPL-SCNC: 107 MMOL/L (ref 101–110)
CO2 SERPL-SCNC: 27 MMOL/L (ref 21–32)
CREAT SERPL-MCNC: 1.2 MG/DL (ref 0.6–1)
CRP SERPL-MCNC: <0.3 MG/DL (ref 0–0.9)
DIFFERENTIAL METHOD BLD: ABNORMAL
EOSINOPHIL # BLD: 0.1 K/UL (ref 0–0.8)
EOSINOPHIL NFR BLD: 1 % (ref 0.5–7.8)
ERYTHROCYTE [DISTWIDTH] IN BLOOD BY AUTOMATED COUNT: 12.3 % (ref 11.9–14.6)
ERYTHROCYTE [SEDIMENTATION RATE] IN BLOOD: <1 MM/HR (ref 0–20)
GLOBULIN SER CALC-MCNC: 3.2 G/DL (ref 2.8–4.5)
GLUCOSE SERPL-MCNC: 87 MG/DL (ref 65–100)
HCT VFR BLD AUTO: 42.9 % (ref 35.8–46.3)
HGB BLD-MCNC: 14.1 G/DL (ref 11.7–15.4)
IMM GRANULOCYTES # BLD AUTO: 0 K/UL (ref 0–0.5)
IMM GRANULOCYTES NFR BLD AUTO: 0 % (ref 0–5)
LYMPHOCYTES # BLD: 1.2 K/UL (ref 0.5–4.6)
LYMPHOCYTES NFR BLD: 23 % (ref 13–44)
MCH RBC QN AUTO: 32.2 PG (ref 26.1–32.9)
MCHC RBC AUTO-ENTMCNC: 32.9 G/DL (ref 31.4–35)
MCV RBC AUTO: 97.9 FL (ref 82–102)
MONOCYTES # BLD: 0.4 K/UL (ref 0.1–1.3)
MONOCYTES NFR BLD: 8 % (ref 4–12)
NEUTS SEG # BLD: 3.4 K/UL (ref 1.7–8.2)
NEUTS SEG NFR BLD: 67 % (ref 43–78)
NRBC # BLD: 0 K/UL (ref 0–0.2)
PLATELET # BLD AUTO: 140 K/UL (ref 150–450)
PMV BLD AUTO: 9.3 FL (ref 9.4–12.3)
POTASSIUM SERPL-SCNC: 4.2 MMOL/L (ref 3.5–5.1)
PROT SERPL-MCNC: 6.9 G/DL (ref 6.3–8.2)
RBC # BLD AUTO: 4.38 M/UL (ref 4.05–5.2)
SODIUM SERPL-SCNC: 138 MMOL/L (ref 133–143)
WBC # BLD AUTO: 5 K/UL (ref 4.3–11.1)

## 2022-10-19 PROCEDURE — 99215 OFFICE O/P EST HI 40 MIN: CPT | Performed by: PEDIATRICS

## 2022-10-19 PROCEDURE — 36415 COLL VENOUS BLD VENIPUNCTURE: CPT

## 2022-10-19 PROCEDURE — 85652 RBC SED RATE AUTOMATED: CPT

## 2022-10-19 PROCEDURE — 86140 C-REACTIVE PROTEIN: CPT

## 2022-10-19 PROCEDURE — 85025 COMPLETE CBC W/AUTO DIFF WBC: CPT

## 2022-10-19 PROCEDURE — 80053 COMPREHEN METABOLIC PANEL: CPT

## 2022-10-19 RX ORDER — CELECOXIB 100 MG/1
CAPSULE ORAL
COMMUNITY
Start: 2022-09-14

## 2022-10-19 RX ORDER — CETIRIZINE HYDROCHLORIDE 10 MG/1
10 TABLET ORAL DAILY PRN
COMMUNITY

## 2022-10-19 RX ORDER — TRAZODONE HYDROCHLORIDE 50 MG/1
75 TABLET ORAL PRN
COMMUNITY

## 2022-10-19 RX ORDER — FOLIC ACID 1 MG/1
TABLET ORAL
COMMUNITY
Start: 2022-09-12

## 2022-10-19 ASSESSMENT — PATIENT HEALTH QUESTIONNAIRE - PHQ9
6. FEELING BAD ABOUT YOURSELF - OR THAT YOU ARE A FAILURE OR HAVE LET YOURSELF OR YOUR FAMILY DOWN: 3
9. THOUGHTS THAT YOU WOULD BE BETTER OFF DEAD, OR OF HURTING YOURSELF: 0
5. POOR APPETITE OR OVEREATING: 1
SUM OF ALL RESPONSES TO PHQ QUESTIONS 1-9: 11
1. LITTLE INTEREST OR PLEASURE IN DOING THINGS: 0
SUM OF ALL RESPONSES TO PHQ QUESTIONS 1-9: 11
7. TROUBLE CONCENTRATING ON THINGS, SUCH AS READING THE NEWSPAPER OR WATCHING TELEVISION: 1
2. FEELING DOWN, DEPRESSED OR HOPELESS: 3
4. FEELING TIRED OR HAVING LITTLE ENERGY: 1
3. TROUBLE FALLING OR STAYING ASLEEP: 1
SUM OF ALL RESPONSES TO PHQ9 QUESTIONS 1 & 2: 3
8. MOVING OR SPEAKING SO SLOWLY THAT OTHER PEOPLE COULD HAVE NOTICED. OR THE OPPOSITE, BEING SO FIGETY OR RESTLESS THAT YOU HAVE BEEN MOVING AROUND A LOT MORE THAN USUAL: 1
SUM OF ALL RESPONSES TO PHQ QUESTIONS 1-9: 11
10. IF YOU CHECKED OFF ANY PROBLEMS, HOW DIFFICULT HAVE THESE PROBLEMS MADE IT FOR YOU TO DO YOUR WORK, TAKE CARE OF THINGS AT HOME, OR GET ALONG WITH OTHER PEOPLE: 2
SUM OF ALL RESPONSES TO PHQ QUESTIONS 1-9: 11

## 2022-10-19 NOTE — PATIENT INSTRUCTIONS
Patient Instructions from Today's Visit    Reason for Visit:  Follow up for low white blood cell count, low platelets, bruising    Recent enlargement of lymph nodes in neck (ultrasound of neck 10-13-22 at [facility])    Hx: Lupus  On Benlista, Methotrexate, Plaquenil    Plan:    The lymph nodes on exam today are not significantly enlarged. Dr Joseph Gibson is not overly concerned from an oncological perspective for leukemia/lymphoma. Ok to proceed with ENT consult that is in the works. You may or may not need an excisional biopsy based on their exam.     Your CBC today looks really good. Your white blood cell count has normalized. Your platelets are stable/slightly low. Your hemoglobin is normal.    Dr Joseph Gibson will see you back in 6 months. To follow up on labs and lymph nodes.       Follow Up:  6 months    Recent Lab Results:  Hospital Outpatient Visit on 10/19/2022   Component Date Value Ref Range Status    WBC 10/19/2022 5.0  4.3 - 11.1 K/uL Final    RESULTS CHECKED X 2    RBC 10/19/2022 4.38  4.05 - 5.2 M/uL Final    Hemoglobin 10/19/2022 14.1  11.7 - 15.4 g/dL Final    Hematocrit 10/19/2022 42.9  35.8 - 46.3 % Final    MCV 10/19/2022 97.9  82.0 - 102.0 FL Final    MCH 10/19/2022 32.2  26.1 - 32.9 PG Final    MCHC 10/19/2022 32.9  31.4 - 35.0 g/dL Final    RDW 10/19/2022 12.3  11.9 - 14.6 % Final    Platelets 86/57/2063 140 (A)  150 - 450 K/uL Final    MPV 10/19/2022 9.3 (A)  9.4 - 12.3 FL Final    nRBC 10/19/2022 0.00  0.0 - 0.2 K/uL Final    **Note: Absolute NRBC parameter is now reported with Hemogram**    Differential Type 10/19/2022 AUTOMATED    Final    Seg Neutrophils 10/19/2022 67  43 - 78 % Final    Lymphocytes 10/19/2022 23  13 - 44 % Final    Monocytes 10/19/2022 8  4.0 - 12.0 % Final    Eosinophils % 10/19/2022 1  0.5 - 7.8 % Final    Basophils 10/19/2022 0  0.0 - 2.0 % Final    Immature Granulocytes 10/19/2022 0  0.0 - 5.0 % Final    Segs Absolute 10/19/2022 3.4  1.7 - 8.2 K/UL Final    Absolute Lymph # 10/19/2022 1.2  0.5 - 4.6 K/UL Final    Absolute Mono # 10/19/2022 0.4  0.1 - 1.3 K/UL Final    Absolute Eos # 10/19/2022 0.1  0.0 - 0.8 K/UL Final    Basophils Absolute 10/19/2022 0.0  0.0 - 0.2 K/UL Final    Absolute Immature Granulocyte 10/19/2022 0.0  0.0 - 0.5 K/UL Final    Sodium 10/19/2022 138  133 - 143 mmol/L Final    Potassium 10/19/2022 4.2  3.5 - 5.1 mmol/L Final    Chloride 10/19/2022 107  101 - 110 mmol/L Final    CO2 10/19/2022 27  21 - 32 mmol/L Final    Anion Gap 10/19/2022 4  2 - 11 mmol/L Final    Glucose 10/19/2022 87  65 - 100 mg/dL Final    BUN 10/19/2022 9  6 - 23 MG/DL Final    Creatinine 10/19/2022 1.20 (A)  0.6 - 1.0 MG/DL Final    Est, Glom Filt Rate 10/19/2022 >60  >60 ml/min/1.73m2 Final    Comment:      Pediatric calculator link: [employer].at. org/professionals/kdoqi/gfr_calculatorped       Effective Oct 3, 2022       These results are not intended for use in patients <25years of age. eGFR results are calculated without a race factor using  the 2021 CKD-EPI equation. Careful clinical correlation is recommended, particularly when comparing to results calculated using previous equations. The CKD-EPI equation is less accurate in patients with extremes of muscle mass, extra-renal metabolism of creatinine, excessive creatine ingestion, or following therapy that affects renal tubular secretion.       Calcium 10/19/2022 9.1  8.3 - 10.4 MG/DL Final    Total Bilirubin 10/19/2022 0.3  0.2 - 1.1 MG/DL Final    ALT 10/19/2022 30  12 - 65 U/L Final    AST 10/19/2022 18  15 - 37 U/L Final    Alk Phosphatase 10/19/2022 40 (A)  50 - 136 U/L Final    Total Protein 10/19/2022 6.9  6.3 - 8.2 g/dL Final    Albumin 10/19/2022 3.7  3.5 - 5.0 g/dL Final    Globulin 10/19/2022 3.2  2.8 - 4.5 g/dL Final    Albumin/Globulin Ratio 10/19/2022 1.2  0.4 - 1.6   Final         Treatment Summary has been discussed and given to patient: NA        -------------------------------------------------------------------------------------------------------------------  Please call our office at [phone] if you have any  of the following symptoms:   Fever of 100.5 or greater  Chills  Shortness of breath  Swelling or pain in one leg    After office hours an answering service is available and will contact a provider for emergencies or if you are experiencing any of the above symptoms. Patient has My Chart. My Chart log in information explained on the after visit summary printout at the [facility] [address] desk.

## 2022-10-19 NOTE — PROGRESS NOTES
HISTORY OF PRESENT ILLNESS  Brigida Acharya is a 20 y.o. y.o. female with leukopenia    ABSTRACT  Reason for Referral: Neutropenia, unspecified type     Referring Provider:  Melita Steven DO     Primary Care Provider: Sophia Denny MD     Family History of Cancer/Hematologic Disorders:      Presenting Symptoms: fatigue, joint pain     Narrative with recent with Results/Procedures/Biopsies and Dates completed:   Ms. Pollo Delgado is a 69-year-old  female who reports to have never used tobacco products. She reports alcohol use and reports drug substance use as not currently. Her medical history reports as depression with anxiety, GERD, migraines, chronic hives, POCS, seizures, MICKI and Hashimotos disease. Her surgical history reports as cholecystectomy, I&D of mouth and wisdom teeth extraction. In May 2022, Ms. Pollo Delgado presented to Dr. Ellie Peres with [facility] for lupus evaluation secondary to joint pain with swollen lymph nodes. She reports symptoms started in September 2021. She was taking Plaquenil and Imuran however Imuran discontinued secondary to pancytopenia therefore she started Benlysta. Her May 2022 chemistry panel reported an elevated creatinine of 1.07 with normal BUN. Her May peripheral smear reported platelets as normal. Her 5/6/22 CBC reported a decreased WBC of 2.3, RBC of 3.32, and ANC of 800 with an elevated MCV of 105, MCH of 34.9 and RDW of 19.3 otherwise WNL. Her hepatitis panel reported as negative. Her Sandra iron panel from 5/2022 reported WNL. A referral was placed to hematology to further evaluate her neutropenia. Labs                                     5/6/2022               Notes from Referring Provider: n/a  HPI: dx with SLE 10 months ago started treatment with immuran and anc dropped down to 700 and stopped 1 month ago, some bruising increased plaquenil to 400mg, starts benlysta tomorrow    immuran sign.  Helped pain, but GI issues persists    Lost 10lbs in last 6 weeks  Eating bettet  Had fever and night sweats in 5 months    Patient Denies:   Fevers   Night Sweats   Chills   Weight Loss   Bone Pain   Lymphadenopathy  Patient Denies:  Nose bleeds  Gum bleeds  Bruising or petechia  Bleeding with surgery  Bleeding with accidents  Transfusions  History or free bleeding or hemophilia    Current Outpatient Medications   Medication Sig    celecoxib (CELEBREX) 100 MG capsule TAKE 1 CAPSULE BY MOUTH TWICE A DAY    methotrexate (RHEUMATREX) 2.5 MG chemo tablet     folic acid (FOLVITE) 1 MG tablet TAKE 3 TABLETS BY MOUTH EVERY DAY    cetirizine (ZYRTEC) 10 MG tablet Take 10 mg by mouth daily as needed    traZODone (DESYREL) 50 MG tablet Take 75 mg by mouth as needed for Sleep    atenolol (TENORMIN) 25 MG tablet atenolol 25 mg tablet   TAKE 1 TABLET BY MOUTH TWICE A DAY2/15/22 3 tabs a day    BENLYSTA 200 MG/ML SOAJ     buPROPion HBr (APLENZIN) 348 MG TB24 Aplenzin 348 mg tablet,extended release   TAKE 1 TABLET BY MOUTH EVERY DAY    cyclobenzaprine (FLEXERIL) 10 mg tablet cyclobenzaprine 10 mg tablet   TAKE 1 TABLET BY MOUTH EVERY DAY    esomeprazole (NEXIUM) 20 MG delayed release capsule Take 20 mg by mouth daily    hydroxychloroquine (PLAQUENIL) 200 mg tablet hydroxychloroquine 200 mg tablet   TAKE 1 TABLET BY MOUTH EVERY DAY    pregabalin (LYRICA) 75 MG capsule TAKE 1 CAPSULE BY MOUTH THREE TIMES A DAY AS DIRECTED    vitamin B-12 (CYANOCOBALAMIN) 100 MCG tablet Take 50 mcg by mouth daily    Cannabinoids (THC FREE PO) Take by mouth    ALPRAZolam (XANAX) 0.25 MG tablet Take 0.5 mg by mouth daily as needed.     hyoscyamine (ANASPAZ;LEVSIN) 125 MCG tablet Take 0.125 mg by mouth every 4 hours as needed    levonorgestrel-ethinyl estradiol (AVIANE;ALESSE;LESSINA) 0.1-20 MG-MCG per tablet Take 1 tablet by mouth daily    Oxymetazoline HCl (NASAL SPRAY) 0.05 % SOLN 2 sprays by Nasal route 2 times daily as needed    promethazine (PHENERGAN) 25 MG tablet Take 25 mg by mouth every 6 hours as needed    rOPINIRole (REQUIP) 0.25 mg tablet  (Patient not taking: Reported on 10/19/2022)    Fe Bisgly-Succ-C-Thre-B12-FA (IRON-150 PO) Take by mouth (Patient not taking: Reported on 10/19/2022)    Cholecalciferol (VITAMIN D) 50 MCG (2000 UT) CAPS capsule Take by mouth (Patient not taking: Reported on 10/19/2022)    escitalopram (LEXAPRO) 5 MG tablet Take 10 mg by mouth daily (Patient not taking: Reported on 6/9/2022)    ibuprofen (ADVIL;MOTRIN) 200 MG tablet Take 600 mg by mouth every 8 hours as needed (Patient not taking: Reported on 10/19/2022)    melatonin 5 MG TABS tablet Take 5 mg by mouth (Patient not taking: Reported on 6/9/2022)    naloxone 4 MG/0.1ML LIQD nasal spray Use 1 spray intranasally, then discard. Repeat with new spray every 2 min as needed for opioid overdose symptoms, alternating nostrils. (Patient not taking: Reported on 6/9/2022)    ondansetron (ZOFRAN) 4 MG tablet Take 4 mg by mouth every 8 hours as needed (Patient not taking: Reported on 6/9/2022)     No current facility-administered medications for this visit. Past Medical History:   Diagnosis Date    Depression with anxiety     Managed with meds     GERD (gastroesophageal reflux disease)     OTC as needed     Hashimoto's disease     inactive     Lupus ([facility] [address] 2022    Seizures ([facility] [address]        Past Surgical History:   Procedure Laterality Date    CHOLECYSTECTOMY  2019    OTHER SURGICAL HISTORY      I&D of right side of mouth     WISDOM TOOTH EXTRACTION         Family History   Problem Relation Age of Onset    No Known Problems Mother     No Known Problems Father        Social History     Tobacco Use    Smoking status: Never    Smokeless tobacco: Never   Vaping Use    Vaping Use: Never used   Substance Use Topics    Alcohol use: Not Currently    Drug use: Not Currently         There is no immunization history on file for this patient.     Allergies   Allergen Reactions    Gluten Meal Other (See Comments)         Review of Systems   Review of Systems   Constitutional: Negative. HENT: Negative. Eyes: Negative. Respiratory: Negative. Cardiovascular: Negative. Gastrointestinal: Negative. Genitourinary: Negative. Musculoskeletal: Negative. Skin: Negative. Neurological: Negative. Endo/Heme/Allergies: Negative. Psychiatric/Behavioral: Negative. Pain reviewed fully and addressed this visit  Med review and reconciliation addressed fully this visit  ADLs and performance level addressed, ECOG 0 unless otherwise addressed    Blood pressure 107/76, pulse 61, temperature 98.3 °F (36.8 °C), temperature source Oral, resp. rate 16, height 5' 3\" (1.6 m), weight 155 lb 9.6 oz (70.6 kg), SpO2 94 %, not currently breastfeeding. Physical Exam:   Constitutional: Well developed, well nourished female in no acute distress, sitting comfortably   HEENT: Normocephalic and atraumatic. Oropharynx is clear, mucous membranes are moist.  Pupils are equal, round, and reactive to light. Extraocular muscles are intact. Sclerae anicteric. Neck supple without JVD. No thyromegaly present. Lymph node   No palpable submandibular, cervical, supraclavicular, axillary or inguinal lymph nodes. Skin Warm and dry. No bruising and no rash noted. No erythema. No pallor. Respiratory Lungs are clear to auscultation bilaterally without wheezes, rales or rhonchi, normal air exchange without accessory muscle use. CVS Normal rate, regular rhythm and normal S1 and S2. No murmurs, gallops, or rubs. Abdomen Soft, nontender and nondistended, normoactive bowel sounds. No palpable mass. No hepatosplenomegaly. Neuro Grossly nonfocal with no obvious sensory or motor deficits. MSK Normal range of motion in general.  No edema and no tenderness. PS ECOG = 0   Psych Appropriate mood and affect.           Hospital Outpatient Visit on 10/19/2022   Component Date Value Ref Range Status    WBC 10/19/2022 5.0  4.3 - 11.1 K/uL Final    RESULTS CHECKED X 2 RBC 10/19/2022 4.38  4.05 - 5.2 M/uL Final    Hemoglobin 10/19/2022 14.1  11.7 - 15.4 g/dL Final    Hematocrit 10/19/2022 42.9  35.8 - 46.3 % Final    MCV 10/19/2022 97.9  82.0 - 102.0 FL Final    MCH 10/19/2022 32.2  26.1 - 32.9 PG Final    MCHC 10/19/2022 32.9  31.4 - 35.0 g/dL Final    RDW 10/19/2022 12.3  11.9 - 14.6 % Final    Platelets 55/20/4061 140 (A)  150 - 450 K/uL Final    MPV 10/19/2022 9.3 (A)  9.4 - 12.3 FL Final    nRBC 10/19/2022 0.00  0.0 - 0.2 K/uL Final    **Note: Absolute NRBC parameter is now reported with Hemogram**    Differential Type 10/19/2022 AUTOMATED    Final    Seg Neutrophils 10/19/2022 67  43 - 78 % Final    Lymphocytes 10/19/2022 23  13 - 44 % Final    Monocytes 10/19/2022 8  4.0 - 12.0 % Final    Eosinophils % 10/19/2022 1  0.5 - 7.8 % Final    Basophils 10/19/2022 0  0.0 - 2.0 % Final    Immature Granulocytes 10/19/2022 0  0.0 - 5.0 % Final    Segs Absolute 10/19/2022 3.4  1.7 - 8.2 K/UL Final    Absolute Lymph # 10/19/2022 1.2  0.5 - 4.6 K/UL Final    Absolute Mono # 10/19/2022 0.4  0.1 - 1.3 K/UL Final    Absolute Eos # 10/19/2022 0.1  0.0 - 0.8 K/UL Final    Basophils Absolute 10/19/2022 0.0  0.0 - 0.2 K/UL Final    Absolute Immature Granulocyte 10/19/2022 0.0  0.0 - 0.5 K/UL Final    Sodium 10/19/2022 138  133 - 143 mmol/L Final    Potassium 10/19/2022 4.2  3.5 - 5.1 mmol/L Final    Chloride 10/19/2022 107  101 - 110 mmol/L Final    CO2 10/19/2022 27  21 - 32 mmol/L Final    Anion Gap 10/19/2022 4  2 - 11 mmol/L Final    Glucose 10/19/2022 87  65 - 100 mg/dL Final    BUN 10/19/2022 9  6 - 23 MG/DL Final    Creatinine 10/19/2022 1.20 (A)  0.6 - 1.0 MG/DL Final    Est, Glom Filt Rate 10/19/2022 >60  >60 ml/min/1.73m2 Final    Comment:      Pediatric calculator link: [email or web address].[email or web address] org/professionals/kdoqi/gfr_calculatorped       Effective Oct 3, 2022       These results are not intended for use in patients <25years of age.        eGFR results are calculated without a race factor using  the 2021 CKD-EPI equation. Careful clinical correlation is recommended, particularly when comparing to results calculated using previous equations. The CKD-EPI equation is less accurate in patients with extremes of muscle mass, extra-renal metabolism of creatinine, excessive creatine ingestion, or following therapy that affects renal tubular secretion. Calcium 10/19/2022 9.1  8.3 - 10.4 MG/DL Final    Total Bilirubin 10/19/2022 0.3  0.2 - 1.1 MG/DL Final    ALT 10/19/2022 30  12 - 65 U/L Final    AST 10/19/2022 18  15 - 37 U/L Final    Alk Phosphatase 10/19/2022 40 (A)  50 - 136 U/L Final    Total Protein 10/19/2022 6.9  6.3 - 8.2 g/dL Final    Albumin 10/19/2022 3.7  3.5 - 5.0 g/dL Final    Globulin 10/19/2022 3.2  2.8 - 4.5 g/dL Final    Albumin/Globulin Ratio 10/19/2022 1.2  0.4 - 1.6   Final     Gall bladder in 2019    Radiology:  CT Results (most recent):  No results found for this or any previous visit from the past 365 days. PET Results (most recent):  No results found for this or any previous visit from the past 365 days. ORALIA Results (most recent):  No results found for this or any previous visit from the past 365 days. US  No results found for this or any previous visit from the past 365 days. Patient Active Problem List   Diagnosis    Maxillary sinusitis    Acute abscess of maxillary sinus         ASSESSMENT and PLAN  19 yo with a history of SLE s/p immuran and just starting benlysta tomorrow referred for leukopenia, with persistent low wbc necessitating stopping immuran. Has recovered wbc and plts now lower and MCV elevated likely c/w immuran marrow toxicity. 1) Leukopenia: likely immuran but possible SLE, but improving  -continue to follow up with rheum, and monitor  -no lad or sign.  b symptoms currently  -no increased risk of infection  -not likely to have marrow issues with benlysta  2) thrombocytopenia  -likely immuran v. SLE, indirect ab pending  -monitor during therapy, reassurance  -repeat CBC in 1 month, pe and Labs in 3 months  3) Macrocytosis: almost certainly stress hematopoiesis from immuran  -follow up pending  Strong reassurance    10/19/22  Still with sign.  Issues with fatigue and not feeling well, with waxing and waning LAD right and left neck, US shows LN bilateral Lns, 1.2 cm max size  -exam benign this am  -reassurance: likely represents  lupus/inflammation and not oncologic  -has ENT referral already, encourage to continue  -obtain inflammatory markers  -counts recovered recently started MTX  -call if LN recur and will workin   Total time 45 min 50% in direct consultation about the patient's diagnosis and management  Nan Edwards MD  Director, Adolescent Young Adult [address] [facility]  [id number] [place] [address]  25 [facility], [address] [place] [address]  AY Phone

## 2023-09-25 ENCOUNTER — APPOINTMENT (RX ONLY)
Dept: URBAN - METROPOLITAN AREA CLINIC 329 | Facility: CLINIC | Age: 23
Setting detail: DERMATOLOGY
End: 2023-09-25

## 2023-09-25 DIAGNOSIS — Z79.899 OTHER LONG TERM (CURRENT) DRUG THERAPY: ICD-10-CM

## 2023-09-25 DIAGNOSIS — L70.0 ACNE VULGARIS: ICD-10-CM

## 2023-09-25 PROCEDURE — 99204 OFFICE O/P NEW MOD 45 MIN: CPT

## 2023-09-25 PROCEDURE — ? ORDER TESTS

## 2023-09-25 PROCEDURE — ? COUNSELING

## 2023-09-25 PROCEDURE — ? URINE PREGNANCY TEST

## 2023-09-25 PROCEDURE — ? HIGH RISK MEDICATION MONITORING

## 2023-09-25 PROCEDURE — ? PRESCRIPTION MEDICATION MANAGEMENT

## 2023-09-25 PROCEDURE — ? ISOTRETINOIN INITIATION

## 2023-09-25 PROCEDURE — 81025 URINE PREGNANCY TEST: CPT

## 2023-09-25 PROCEDURE — ? FULL BODY SKIN EXAM - DECLINED

## 2023-09-25 ASSESSMENT — LOCATION SIMPLE DESCRIPTION DERM: LOCATION SIMPLE: GLABELLA

## 2023-09-25 ASSESSMENT — LOCATION DETAILED DESCRIPTION DERM: LOCATION DETAILED: GLABELLA

## 2023-09-25 ASSESSMENT — LOCATION ZONE DERM: LOCATION ZONE: FACE

## 2023-09-25 NOTE — PROCEDURE: HIGH RISK MEDICATION MONITORING
Conjuntivae and eyelids appear normal , Sclerae : White without injection Bexarotene Pregnancy And Lactation Text: This medication is Pregnancy Category X and should not be given to women who are pregnant or may become pregnant. This medication should not be used if you are breast feeding.

## 2023-09-25 NOTE — PROCEDURE: URINE PREGNANCY TEST
Continue Regimen: Dupixent every two weeks; Tacrolimus 0.1% ointment twice daily as needed to face (has plenty at home) Plan: If Dupixent becomes ineffective consider Adbry or MISHEL inhibitors, briefly mentioned today Render In Strict Bullet Format?: No Detail Level: Zone Lot # (Optional): [id number] Lot # (Optional): [id number]

## 2023-09-25 NOTE — PROCEDURE: HIGH RISK MEDICATION MONITORING
patient Odomzo Counseling- I discussed with the patient the risks of Odomzo including but not limited to nausea, vomiting, diarrhea, constipation, weight loss, changes in the sense of taste, decreased appetite, muscle spasms, and hair loss.  The patient verbalized understanding of the proper use and possible adverse effects of Odomzo.  All of the patient's questions and concerns were addressed.

## 2023-09-25 NOTE — PROCEDURE: ISOTRETINOIN INITIATION
Anticipated Starting Dosage (Optional): 60mg Daily
Detail Level: Zone
Patient Reported Weight (Optional - Include Units): 170 lbs

## 2023-09-25 NOTE — PROCEDURE: ORDER TESTS
Bill For Surgical Tray: no
Expected Date Of Service: 09/25/2023
Billing Type: Third-Party Bill
Performing Laboratory: [phone]

## 2023-09-25 NOTE — PROCEDURE: COUNSELING
Azithromycin Counseling:  I discussed with the patient the risks of azithromycin including but not limited to GI upset, allergic reaction, drug rash, diarrhea, and yeast infections.
Birth Control Pills Counseling: Birth Control Pill Counseling: I discussed with the patient the potential side effects of OCPs including but not limited to increased risk of stroke, heart attack, thrombophlebitis, deep venous thrombosis, hepatic adenomas, breast changes, GI upset, headaches, and depression.  The patient verbalized understanding of the proper use and possible adverse effects of OCPs. All of the patient's questions and concerns were addressed.
Tazorac Pregnancy And Lactation Text: This medication is not safe during pregnancy. It is unknown if this medication is excreted in breast milk.
Minocycline Pregnancy And Lactation Text: This medication is Pregnancy Category D and not consider safe during pregnancy. It is also excreted in breast milk.
Topical Clindamycin Pregnancy And Lactation Text: This medication is Pregnancy Category B and is considered safe during pregnancy. It is unknown if it is excreted in breast milk.
Topical Sulfur Applications Pregnancy And Lactation Text: This medication is Pregnancy Category C and has an unknown safety profile during pregnancy. It is unknown if this topical medication is excreted in breast milk.
Dapsone Counseling: I discussed with the patient the risks of dapsone including but not limited to hemolytic anemia, agranulocytosis, rashes, methemoglobinemia, kidney failure, peripheral neuropathy, headaches, GI upset, and liver toxicity.  Patients who start dapsone require monitoring including baseline LFTs and weekly CBCs for the first month, then every month thereafter.  The patient verbalized understanding of the proper use and possible adverse effects of dapsone.  All of the patient's questions and concerns were addressed.
Doxycycline Counseling:  Patient counseled regarding possible photosensitivity and increased risk for sunburn.  Patient instructed to avoid sunlight, if possible.  When exposed to sunlight, patients should wear protective clothing, sunglasses, and sunscreen.  The patient was instructed to call the office immediately if the following severe adverse effects occur:  hearing changes, easy bruising/bleeding, severe headache, or vision changes.  The patient verbalized understanding of the proper use and possible adverse effects of doxycycline.  All of the patient's questions and concerns were addressed.
Erythromycin Counseling:  I discussed with the patient the risks of erythromycin including but not limited to GI upset, allergic reaction, drug rash, diarrhea, increase in liver enzymes, and yeast infections.
Azelaic Acid Pregnancy And Lactation Text: This medication is considered safe during pregnancy and breast feeding.
Include Pregnancy/Lactation Warning?: No
Erythromycin Pregnancy And Lactation Text: This medication is Pregnancy Category B and is considered safe during pregnancy. It is also excreted in breast milk.
Isotretinoin Pregnancy And Lactation Text: This medication is Pregnancy Category X and is considered extremely dangerous during pregnancy. It is unknown if it is excreted in breast milk.
Winlevi Counseling:  I discussed with the patient the risks of topical clascoterone including but not limited to erythema, scaling, itching, and stinging. Patient voiced their understanding.
Bactrim Counseling:  I discussed with the patient the risks of sulfa antibiotics including but not limited to GI upset, allergic reaction, drug rash, diarrhea, dizziness, photosensitivity, and yeast infections.  Rarely, more serious reactions can occur including but not limited to aplastic anemia, agranulocytosis, methemoglobinemia, blood dyscrasias, liver or kidney failure, lung infiltrates or desquamative/blistering drug rashes.
Topical Retinoid Pregnancy And Lactation Text: This medication is Pregnancy Category C. It is unknown if this medication is excreted in breast milk.
High Dose Vitamin A Pregnancy And Lactation Text: High dose vitamin A therapy is contraindicated during pregnancy and breast feeding.
Benzoyl Peroxide Pregnancy And Lactation Text: This medication is Pregnancy Category C. It is unknown if benzoyl peroxide is excreted in breast milk.
Birth Control Pills Pregnancy And Lactation Text: This medication should be avoided if pregnant and for the first 30 days post-partum.
Tazorac Counseling:  Patient advised that medication is irritating and drying.  Patient may need to apply sparingly and wash off after an hour before eventually leaving it on overnight.  The patient verbalized understanding of the proper use and possible adverse effects of tazorac.  All of the patient's questions and concerns were addressed.
Topical Clindamycin Counseling: Patient counseled that this medication may cause skin irritation or allergic reactions.  In the event of skin irritation, the patient was advised to reduce the amount of the drug applied or use it less frequently.   The patient verbalized understanding of the proper use and possible adverse effects of clindamycin.  All of the patient's questions and concerns were addressed.
Minocycline Counseling: Patient advised regarding possible photosensitivity and discoloration of the teeth, skin, lips, tongue and gums.  Patient instructed to avoid sunlight, if possible.  When exposed to sunlight, patients should wear protective clothing, sunglasses, and sunscreen.  The patient was instructed to call the office immediately if the following severe adverse effects occur:  hearing changes, easy bruising/bleeding, severe headache, or vision changes.  The patient verbalized understanding of the proper use and possible adverse effects of minocycline.  All of the patient's questions and concerns were addressed.
Sarecycline Counseling: Patient advised regarding possible photosensitivity and discoloration of the teeth, skin, lips, tongue and gums.  Patient instructed to avoid sunlight, if possible.  When exposed to sunlight, patients should wear protective clothing, sunglasses, and sunscreen.  The patient was instructed to call the office immediately if the following severe adverse effects occur:  hearing changes, easy bruising/bleeding, severe headache, or vision changes.  The patient verbalized understanding of the proper use and possible adverse effects of sarecycline.  All of the patient's questions and concerns were addressed.
Topical Sulfur Applications Counseling: Topical Sulfur Counseling: Patient counseled that this medication may cause skin irritation or allergic reactions.  In the event of skin irritation, the patient was advised to reduce the amount of the drug applied or use it less frequently.   The patient verbalized understanding of the proper use and possible adverse effects of topical sulfur application.  All of the patient's questions and concerns were addressed.
Detail Level: Zone
Dapsone Pregnancy And Lactation Text: This medication is Pregnancy Category C and is not considered safe during pregnancy or breast feeding.
Doxycycline Pregnancy And Lactation Text: This medication is Pregnancy Category D and not consider safe during pregnancy. It is also excreted in breast milk but is considered safe for shorter treatment courses.
Spironolactone Pregnancy And Lactation Text: This medication can cause feminization of the male fetus and should be avoided during pregnancy. The active metabolite is also found in breast milk.
Spironolactone Counseling: Patient advised regarding risks of diarrhea, abdominal pain, hyperkalemia, birth defects (for female patients), liver toxicity and renal toxicity. The patient may need blood work to monitor liver and kidney function and potassium levels while on therapy. The patient verbalized understanding of the proper use and possible adverse effects of spironolactone.  All of the patient's questions and concerns were addressed.
Azelaic Acid Counseling: Patient counseled that medicine may cause skin irritation and to avoid applying near the eyes.  In the event of skin irritation, the patient was advised to reduce the amount of the drug applied or use it less frequently.   The patient verbalized understanding of the proper use and possible adverse effects of azelaic acid.  All of the patient's questions and concerns were addressed.
Isotretinoin Counseling: Patient should get monthly blood tests, not donate blood, not drive at night if vision affected, not share medication, and not undergo elective surgery for 6 months after tx completed. Side effects reviewed, pt to contact office should one occur.
Tetracycline Counseling: Patient counseled regarding possible photosensitivity and increased risk for sunburn.  Patient instructed to avoid sunlight, if possible.  When exposed to sunlight, patients should wear protective clothing, sunglasses, and sunscreen.  The patient was instructed to call the office immediately if the following severe adverse effects occur:  hearing changes, easy bruising/bleeding, severe headache, or vision changes.  The patient verbalized understanding of the proper use and possible adverse effects of tetracycline.  All of the patient's questions and concerns were addressed. Patient understands to avoid pregnancy while on therapy due to potential birth defects.
Benzoyl Peroxide Counseling: Patient counseled that medicine may cause skin irritation and bleach clothing.  In the event of skin irritation, the patient was advised to reduce the amount of the drug applied or use it less frequently.   The patient verbalized understanding of the proper use and possible adverse effects of benzoyl peroxide.  All of the patient's questions and concerns were addressed.
Winlevi Pregnancy And Lactation Text: This medication is considered safe during pregnancy and breastfeeding.
Azithromycin Pregnancy And Lactation Text: This medication is considered safe during pregnancy and is also secreted in breast milk.
Bactrim Pregnancy And Lactation Text: This medication is Pregnancy Category D and is known to cause fetal risk.  It is also excreted in breast milk.
High Dose Vitamin A Counseling: Side effects reviewed, pt to contact office should one occur.
Topical Retinoid counseling:  Patient advised to apply a pea-sized amount only at bedtime and wait 30 minutes after washing their face before applying.  If too drying, patient may add a non-comedogenic moisturizer. The patient verbalized understanding of the proper use and possible adverse effects of retinoids.  All of the patient's questions and concerns were addressed.

## (undated) DEVICE — KIT PROCEDURE SURG HEAD AND NECK TOTE

## (undated) DEVICE — SUT SLK 3-0 30IN SH BLK --

## (undated) DEVICE — DRAIN SURG PENROSE 0.25X12 IN CLOSED WND DRAINAGE PREM SIL

## (undated) DEVICE — SUT CHRMC 3-0 27IN SH BRN --

## (undated) DEVICE — CATHETER IV 18GA L1.25IN GRN FEP SFTY STR HUB RADPQ DISP

## (undated) DEVICE — GOWN,REINF,POLY,ECL,PP SLV,XL: Brand: MEDLINE

## (undated) DEVICE — BUTTON SWITCH PENCIL BLADE ELECTRODE, HOLSTER: Brand: EDGE

## (undated) DEVICE — SWAB CULT SGL AMIES W/O CHAR FOR THRT VAG SKIN HRT CULTSWAB